# Patient Record
Sex: MALE | Race: WHITE | URBAN - METROPOLITAN AREA
[De-identification: names, ages, dates, MRNs, and addresses within clinical notes are randomized per-mention and may not be internally consistent; named-entity substitution may affect disease eponyms.]

---

## 2017-01-12 ENCOUNTER — TRANSFERRED RECORDS (OUTPATIENT)
Dept: HEALTH INFORMATION MANAGEMENT | Facility: CLINIC | Age: 67
End: 2017-01-12

## 2017-02-21 ENCOUNTER — HOSPITAL ENCOUNTER (OUTPATIENT)
Dept: PHYSICAL THERAPY | Facility: CLINIC | Age: 67
Setting detail: THERAPIES SERIES
End: 2017-02-21
Payer: COMMERCIAL

## 2017-02-21 PROCEDURE — 40000719 ZZHC STATISTIC PT DEPARTMENT NEURO VISIT: Performed by: PHYSICAL THERAPIST

## 2017-02-21 PROCEDURE — 97162 PT EVAL MOD COMPLEX 30 MIN: CPT | Mod: GP | Performed by: PHYSICAL THERAPIST

## 2017-02-21 NOTE — PROGRESS NOTES
[facility]        OUTPATIENT PHYSICAL THERAPY FUNCTIONAL EVALUATION  PLAN OF TREATMENT FOR OUTPATIENT REHABILITATION  (COMPLETE FOR INITIAL CLAIMS ONLY)  Patient's Last Name, First Name, M.I.  YOB: 1950  Don Redd        Provider's Name   [facility]   Medical Record No.  [id number]     Start of Care Date:  02/21/17   Onset Date:  01/12/17   Type:     _X__PT   ____OT  ____SLP Medical Diagnosis:  balance problem     PT Diagnosis:  imbalance, impaired gait pattern, deconditioned Visits from SOC:  1                              __________________________________________________________________________________  Plan of Treatment: balance training, gait training, neuromuscular re-education, ROM, strengthening, stretching, transfer training, conditioning ex     Functional Goals:  1  Pt will score 25/30 on the Functional Gait Assessment indicating a reduced falls risk.  04/21/17    2  Pt will be free from falls both in/out of his home.  04/21/17    3  Pt will be able to walk on uneven ground without LOB to be able to participate in hunting safely.  04/21/17    Therapy Frequency:  2 times/Week     Predicted Duration of Therapy Intervention:  60 days    Chelsey Carty, PT                                    I CERTIFY THE NEED FOR THESE SERVICES FURNISHED UNDER        THIS PLAN OF TREATMENT AND WHILE UNDER MY CARE .             Physician Signature               Date    X_____________________________________________________                      Certification Date From:  02/21/17   Certification Date To:  04/21/17    Referring Provider:  Dr. Gabriel Hayes - [facility]    Initial Assessment  See Epic Evaluation- Start of Care Date: 02/21/17

## 2017-02-21 NOTE — PROGRESS NOTES
" 02/21/17 1031   Quick Adds   Quick Adds Certification;Vestibular Eval   Type of Visit Initial OP PT Evaluation   General Information   Start of Care Date 02/21/17   Referring Physician Dr. Gabriel Hayes - [facility]   Orders Evaluate and Treat as Indicated   Order Date 01/12/17   Medical Diagnosis Balance problem   Onset of illness/injury or Date of Surgery 01/12/17   Precautions/Limitations no known precautions/limitations   Surgical/Medical history reviewed Yes   Pertinent history of current problem (include personal factors and/or comorbidities that impact the POC) Pt reports a gradual onset of balance problems, he can't walk a straight line sometimes.  Seems to veer R most of the time.  Thinks he wouldn't \"pass a sobriety test\" even though he hasn't been drinking.  Sx seem to be worsening over the last few months.  He trips easily and this is worse on uneven ground.  He had an overnight observation stay at [facility] on 12/24/16 for an episode of syncope and reports no further episodes of this. He denies issues with dizziness, lightheadedness or vision problems.  He does wear bifocals and will be getting hearing aids soon.  He has known CTS B and reports he is having another surgery on the R side in March for this as well as a \"moving the nerve\" surgery on the R side.  He has had a 2 level cervical fusion in the last few years as well as \"back surgery\".  He has had trouble with his R leg before surgery and even now, it gives out on him at times.  He feels both arms are weak, poor  and he tires easily.  He reports a PmHx of DM and a slight heart murmur. EPIC also shows HTN and high cholesterol.  He feels like he is always walking on something on both feet, more in the area of the met heads.  He has trouble with stairs - both going up and down.  He initially denies pain when asked but then says his back \"always hurts\".   He reports he checks his BS every once in awhile.   Current Community " "Support Family/friend caregiver   Patient role/Employment history Retired  ()   Living environment House/[facility]   Home/Community Accessibility Comments lives in mother-in-law apartment, has to go down 4 steps with one railing to get into house   Patient/Family Goals Statement wants to balance better   General Information Comments drives own vehicle   Fall Risk Screen   Fall screen completed by PT   Timed Up and Go score (seconds) 10.44\"   Is patient a fall risk? Yes;Department fall risk interventions implemented   Fall screen comments Just the one fall when he \"passed out\" reported.   Cognitive Status Examination   Orientation orientation to person, place and time   Level of Consciousness alert   Follows Commands and Answers Questions 100% of the time   Personal Safety and Judgment intact   Cognitive Comment appears slightly slow to process at times   Observation   Observation appears slightly older than age, very slight difficulty understanding speech at times - not sure if due to dentures or ?, slight flat affect   Integumentary   Integumentary Comments B hands appeared slightly swollen - pt reports this is normal   Posture   Posture Forward head position;Protracted shoulders;Kyphosis   Range of Motion (ROM)   ROM Comment reduced R SH flexion by 25%   Strength   Strength Comments general LE strength 4+/5 B except 4- to 4/5 R knee extension   Gait Special Tests   Gait Special Tests FUNCTIONAL GAIT ASSESSMENT   Gait Special Tests Functional Gait Assessment Score out of 30   Score out of 30 20/30   Comments see flow sheet   Balance   Balance Comments 5 Times Sit to Stand test: shoes on, used arms on chair, took 15.19 sec to complete   Balance Special Tests   Balance Special Tests Timed up and go;Modified CTSIB Conditions;Romberg;Sit to stand reps   Balance Special Tests Timed Up and Go   Seconds 10.44 Seconds   Comments no AD, wide [place] when turning   Balance Special Tests Modified CTSIB Conditions "   Condition 1, seconds 30 Seconds   Condition 2, seconds 30 Seconds   Condition 4, seconds 30 Seconds   Condition 5, seconds 10.87 Seconds   Modified CTSIB Comments shoes on   Balance Special Tests Romberg   Seconds 30 Seconds   Comments both with EO and EC - increased sway with both conditions   Sensory Examination   Sensory Perception Comments intact to light touch in B lower legs although pt reports hx of walking on something near plantar surface of met heads B   Muscle Tone   Muscle Tone Comments generally deconditioned   Oculomotor Exam   Smooth Pursuit Comment normal smooth pursuit   Saccades Comments did not appear to be focusing on fingers, slight overshoots B directions   Infrared Goggle Exam or Frenzel Lense Exam   Exam completed with Room Light   Spontaneous Nystagmus Negative   Planned Therapy Interventions   Planned Therapy Interventions balance training;gait training;neuromuscular re-education;ROM;strengthening;stretching;transfer training   Planned Therapy Interventions Comment conditioning ex   Clinical Impression   Criteria for Skilled Therapeutic Interventions Met yes, treatment indicated   PT Diagnosis imbalance, impaired gait pattern, deconditioned   Influenced by the following impairments imbalance, weakness   Functional limitations due to impairments walking, standing, stairs, outdoor activities like hunting   Clinical Presentation Evolving/Changing   Clinical Presentation Rationale History: DM, recent DC from hospital, awaiting UE surgery and inactive; Examination: falls risk category on FGA, score on 5 Times Sit to Stand, gait instability walking outside; Presentation: gradual worsening of sx; Decision-making: moderate complexity   Clinical Decision Making (Complexity) Moderate complexity   Therapy Frequency 2 times/Week   Predicted Duration of Therapy Intervention (days/wks) 60 days   Risk & Benefits of therapy have been explained Yes   Patient, Family & other staff in agreement with plan of  care Yes   Clinical Impression Comments Pt presents to PT with impaired balance which puts him at a falls risk.  Cause of imbalance is unknown at this time.  Pt would benefit from skilled PT services addressing balance deficits as well as deconditioning/weakness.  Presentation is complicated by hx of cervical and lumbar surgeries and ongoing carpal tunnel issues/future surgery.  Pt may benefit from ruling out any central component to balance impairments if improvement is not noted over time.   Education Assessment   Preferred Learning Style Listening;Reading;Demonstration;Pictures/video   Barriers to Learning No barriers   GOALS   PT Eval Goals 1;2;3   Goal 1   Goal Identifier 1   Goal Description Pt will score 25/30 on the Functional Gait Assessment indicating a reduced falls risk.   Target Date 04/21/17   Goal 2   Goal Identifier 2   Goal Description Pt will be free from falls both in/out of his home.   Target Date 04/21/17   Goal 3   Goal Identifier 3   Goal Description Pt will be able to walk on uneven ground without LOB to be able to participate in hunting safely.   Target Date 04/21/17   Total Evaluation Time   Total Evaluation Time (Minutes) 55   Therapy Certification   Certification date from 02/21/17   Certification date to 04/21/17   Medical Diagnosis balance problem   Certification I certify the need for these services furnished under this plan of treatment and while under my care.  (Physician co-signature of this document indicates review and certification of the therapy plan).       Thank you for referring Griffin to physical therapy.  If you have any questions, please call [phone].      Chelsey Carty, PT  [facility]

## 2017-02-27 ENCOUNTER — HOSPITAL ENCOUNTER (OUTPATIENT)
Dept: PHYSICAL THERAPY | Facility: CLINIC | Age: 67
Setting detail: THERAPIES SERIES
End: 2017-02-27
Payer: COMMERCIAL

## 2017-02-27 PROCEDURE — 97112 NEUROMUSCULAR REEDUCATION: CPT | Mod: GP | Performed by: PHYSICAL THERAPIST

## 2017-02-27 PROCEDURE — 40000719 ZZHC STATISTIC PT DEPARTMENT NEURO VISIT: Performed by: PHYSICAL THERAPIST

## 2017-03-03 ENCOUNTER — HOSPITAL ENCOUNTER (OUTPATIENT)
Dept: PHYSICAL THERAPY | Facility: CLINIC | Age: 67
Setting detail: THERAPIES SERIES
End: 2017-03-03
Payer: COMMERCIAL

## 2017-03-03 PROCEDURE — 97116 GAIT TRAINING THERAPY: CPT | Mod: GP | Performed by: PHYSICAL THERAPIST

## 2017-03-03 PROCEDURE — 40000719 ZZHC STATISTIC PT DEPARTMENT NEURO VISIT: Performed by: PHYSICAL THERAPIST

## 2017-03-03 PROCEDURE — 97112 NEUROMUSCULAR REEDUCATION: CPT | Mod: GP | Performed by: PHYSICAL THERAPIST

## 2017-03-10 ENCOUNTER — HOSPITAL ENCOUNTER (OUTPATIENT)
Dept: PHYSICAL THERAPY | Facility: CLINIC | Age: 67
Setting detail: THERAPIES SERIES
End: 2017-03-10
Payer: COMMERCIAL

## 2017-03-10 PROCEDURE — 97110 THERAPEUTIC EXERCISES: CPT | Mod: GP | Performed by: PHYSICAL THERAPIST

## 2017-03-10 PROCEDURE — 97112 NEUROMUSCULAR REEDUCATION: CPT | Mod: GP | Performed by: PHYSICAL THERAPIST

## 2017-03-10 PROCEDURE — 40000719 ZZHC STATISTIC PT DEPARTMENT NEURO VISIT: Performed by: PHYSICAL THERAPIST

## 2017-03-13 ENCOUNTER — HOSPITAL ENCOUNTER (OUTPATIENT)
Dept: PHYSICAL THERAPY | Facility: CLINIC | Age: 67
Setting detail: THERAPIES SERIES
End: 2017-03-13
Payer: COMMERCIAL

## 2017-03-13 PROCEDURE — 40000719 ZZHC STATISTIC PT DEPARTMENT NEURO VISIT: Performed by: PHYSICAL THERAPIST

## 2017-03-13 PROCEDURE — 97112 NEUROMUSCULAR REEDUCATION: CPT | Mod: GP | Performed by: PHYSICAL THERAPIST

## 2017-03-13 PROCEDURE — 97110 THERAPEUTIC EXERCISES: CPT | Mod: GP | Performed by: PHYSICAL THERAPIST

## 2017-03-15 ENCOUNTER — HOSPITAL ENCOUNTER (OUTPATIENT)
Dept: PHYSICAL THERAPY | Facility: CLINIC | Age: 67
Setting detail: THERAPIES SERIES
End: 2017-03-15
Payer: COMMERCIAL

## 2017-03-15 PROCEDURE — 97110 THERAPEUTIC EXERCISES: CPT | Mod: GP | Performed by: PHYSICAL THERAPIST

## 2017-03-15 PROCEDURE — 97112 NEUROMUSCULAR REEDUCATION: CPT | Mod: GP | Performed by: PHYSICAL THERAPIST

## 2017-03-15 PROCEDURE — 40000719 ZZHC STATISTIC PT DEPARTMENT NEURO VISIT: Performed by: PHYSICAL THERAPIST

## 2017-04-27 NOTE — PROGRESS NOTES
Outpatient Physical Therapy Discharge Note     Patient: Don Redd    : 1950    Beginning/End Dates of Reporting Period: 17 to 3/15/17    Referring Provider: Dr. Hayes    Therapy Diagnosis: imbalance, impaired gait pattern, deconditioned     Client Self Report: Nothing too new.    Objective Measurements:  Objective Measure: observation  Details: observed pt veering L with self-corrected LOB when walking in     Goals:  Goal Identifier 1   Goal Description Pt will score 25/30 on the Functional Gait Assessment indicating a reduced falls risk.   Target Date 17   Date Met      Progress:     Goal Identifier 2   Goal Description Pt will be free from falls both in/out of his home.   Target Date 17   Date Met      Progress:     Goal Identifier 3   Goal Description Pt will be able to walk on uneven ground without LOB to be able to participate in hunting safely.   Target Date 17   Date Met      Progress:     Progress Toward Goals:  Unknown if specific goals are met as pt did not return for last appointment when the plan was to recheck objective measures.  Pt was planning to have surgery on his hand/elbow and then wanted to follow up with his provider re: some LB concerns.  He never returned so he will be discharged from PT.    Plan: Discharge from therapy.    Reason for Discharge: Patient chooses to discontinue therapy. Patient has failed to schedule further appointments.    Equipment Issued: none    Discharge Plan: follow up with provider

## 2017-04-27 NOTE — ADDENDUM NOTE
Encounter addended by: Chelsey Carty, PT on: 4/27/2017  9:37 AM<BR>     Actions taken: Episode resolved, Pend clinical note, Sign clinical note

## 2017-05-23 ENCOUNTER — HOSPITAL ENCOUNTER (EMERGENCY)
Facility: CLINIC | Age: 67
Discharge: HOME | End: 2017-05-23
Attending: FAMILY MEDICINE | Admitting: FAMILY MEDICINE
Payer: COMMERCIAL

## 2017-05-23 VITALS
BODY MASS INDEX: 25.2 KG/M2 | OXYGEN SATURATION: 97 % | DIASTOLIC BLOOD PRESSURE: 91 MMHG | SYSTOLIC BLOOD PRESSURE: 147 MMHG | HEIGHT: 71 IN | TEMPERATURE: 97.1 F | WEIGHT: 180 LBS | RESPIRATION RATE: 16 BRPM
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
## 2017-05-23 LAB
BASOPHILS # BLD AUTO: 0 10E9/L (ref 0–0.2)
BASOPHILS NFR BLD AUTO: 0.5 %
CRP SERPL-MCNC: <2.9 MG/L (ref 0–8)
DIFFERENTIAL METHOD BLD: ABNORMAL
EOSINOPHIL # BLD AUTO: 0.3 10E9/L (ref 0–0.7)
EOSINOPHIL NFR BLD AUTO: 4.3 %
ERYTHROCYTE [DISTWIDTH] IN BLOOD BY AUTOMATED COUNT: 13.9 % (ref 10–15)
ERYTHROCYTE [SEDIMENTATION RATE] IN BLOOD BY WESTERGREN METHOD: 8 MM/H (ref 0–20)
HCT VFR BLD AUTO: 39 % (ref 40–53)
HGB BLD-MCNC: 13.6 G/DL (ref 13.3–17.7)
IMM GRANULOCYTES # BLD: 0 10E9/L (ref 0–0.4)
IMM GRANULOCYTES NFR BLD: 0.2 %
LYMPHOCYTES # BLD AUTO: 1.6 10E9/L (ref 0.8–5.3)
LYMPHOCYTES NFR BLD AUTO: 24.6 %
MCH RBC QN AUTO: 28 PG (ref 26.5–33)
MCHC RBC AUTO-ENTMCNC: 34.9 G/DL (ref 31.5–36.5)
MCV RBC AUTO: 80 FL (ref 78–100)
MONOCYTES # BLD AUTO: 0.5 10E9/L (ref 0–1.3)
MONOCYTES NFR BLD AUTO: 8 %
NEUTROPHILS # BLD AUTO: 4.1 10E9/L (ref 1.6–8.3)
NEUTROPHILS NFR BLD AUTO: 62.4 %
PLATELET # BLD AUTO: 255 10E9/L (ref 150–450)
RBC # BLD AUTO: 4.85 10E12/L (ref 4.4–5.9)
WBC # BLD AUTO: 6.5 10E9/L (ref 4–11)

## 2017-05-23 PROCEDURE — 99283 EMERGENCY DEPT VISIT LOW MDM: CPT | Performed by: FAMILY MEDICINE

## 2017-05-23 PROCEDURE — 86618 LYME DISEASE ANTIBODY: CPT | Performed by: FAMILY MEDICINE

## 2017-05-23 PROCEDURE — 86140 C-REACTIVE PROTEIN: CPT | Performed by: FAMILY MEDICINE

## 2017-05-23 PROCEDURE — 85652 RBC SED RATE AUTOMATED: CPT | Performed by: FAMILY MEDICINE

## 2017-05-23 PROCEDURE — 85025 COMPLETE CBC W/AUTO DIFF WBC: CPT | Performed by: FAMILY MEDICINE

## 2017-05-23 PROCEDURE — 99283 EMERGENCY DEPT VISIT LOW MDM: CPT | Mod: Z6 | Performed by: FAMILY MEDICINE

## 2017-05-23 RX ORDER — DOXYCYCLINE 100 MG/1
100 CAPSULE ORAL 2 TIMES DAILY
Qty: 28 CAPSULE | Refills: 0 | Status: SHIPPED | OUTPATIENT
Start: 2017-05-23 | End: 2017-06-06

## 2017-05-23 RX ORDER — LIDOCAINE 40 MG/G
CREAM TOPICAL
Status: DISCONTINUED | OUTPATIENT
Start: 2017-05-23 | End: 2017-05-23 | Stop reason: HOSPADM

## 2017-05-23 ASSESSMENT — ENCOUNTER SYMPTOMS: FEVER: 0

## 2017-05-23 NOTE — ED PROVIDER NOTES
History     Chief Complaint   Patient presents with     Cellulitis     The history is provided by the patient.     Don Redd is a 66 year old male who presents to the emergency department with tick bite.  Patient states that a tick was attached 2 or 3 days ago and was removed less than 24 hours after attachment. He wonders if the head is still attached.  Patient reports that the redness started a couple days ago and the area of infection itches. Patient also notes that when he squeezes the area of tick attachment, white pus comes out.  He denies fever or tenderness to the surrounding area.  Patient notes that his only chronic health problem is diabetes.    I have reviewed the Medications, Allergies, Past Medical and Surgical History, and Social History in the Epic system.    Patient Active Problem List   Diagnosis     Syncope, unspecified syncope type     Elevated lactic acid level     Diabetes mellitus, type 2 (H)     Essential hypertension, benign     Hyperlipidemia LDL goal <100     Past Medical History:   Diagnosis Date     Diabetes type 2, controlled (H)      Hypertension      Ureteral calculi 4/2016       Past Surgical History:   Procedure Laterality Date     AS LUMBAR SPINE FUSN,POST INTRBDY  2011     CYSTOSCOPY       FUSION CERVICAL ANTERIOR TWO LEVELS  2011    C5-7       Family History   Problem Relation Age of Onset     Coronary Artery Disease Mother      Breast Cancer Mother      Hypertension Mother      Other Cancer Father      lung cancer       Social History   Substance Use Topics     Smoking status: Former Smoker     Smokeless tobacco: Not on file     Alcohol use Yes        There is no immunization history on file for this patient.     No Known Allergies    Current Outpatient Prescriptions   Medication Sig Dispense Refill     ASPIRIN PO Take 81 mg by mouth daily       lisinopril-hydrochlorothiazide (PRINZIDE/ZESTORETIC) 20-12.5 MG per tablet Take 1 tablet by mouth every morning 30 tablet       "metFORMIN (GLUCOPHAGE) 500 MG tablet Take 1,000 mg by mouth 2 times daily (with meals) Pt takes it in the AM and the evening.       SIMVASTATIN PO Take 40 mg by mouth At Bedtime         Review of Systems   Constitutional: Negative for fever.   All other systems reviewed and are negative.      Physical Exam   BP: (!) 147/91  Heart Rate: 73  Temp: 97.1  F (36.2  C)  Resp: 16  Height: 180.3 cm (5' 11\")  Weight: 81.6 kg (180 lb)  SpO2: 97 %  Physical Exam   Constitutional: He is oriented to person, place, and time. He appears well-developed and well-nourished.   HENT:   Head: Normocephalic and atraumatic.   Eyes: Conjunctivae and EOM are normal.   Neck: Normal range of motion. Neck supple.   Cardiovascular: Normal rate, regular rhythm, normal heart sounds and intact distal pulses.    Pulses:       Dorsalis pedis pulses are 2+ on the left side.        Posterior tibial pulses are 2+ on the left side.   Pulmonary/Chest: Effort normal and breath sounds normal.   Abdominal: Soft. Bowel sounds are normal.   Musculoskeletal: Normal range of motion.   Neurological: He is alert and oriented to person, place, and time.   Skin: Skin is warm. There is erythema (lateral aspect of left leg from ankle to tibia).   Left lower extremity: erythema lateral aspect from ankle to tibia with associated swelling. No tenderness.   Nursing note and vitals reviewed.      ED Course     ED Course     Procedures        Results for orders placed or performed during the hospital encounter of 05/23/17   CBC with platelets differential   Result Value Ref Range    WBC 6.5 4.0 - 11.0 10e9/L    RBC Count 4.85 4.4 - 5.9 10e12/L    Hemoglobin 13.6 13.3 - 17.7 g/dL    Hematocrit 39.0 (L) 40.0 - 53.0 %    MCV 80 78 - 100 fl    MCH 28.0 26.5 - 33.0 pg    MCHC 34.9 31.5 - 36.5 g/dL    RDW 13.9 10.0 - 15.0 %    Platelet Count 255 150 - 450 10e9/L    Diff Method Automated Method     % Neutrophils 62.4 %    % Lymphocytes 24.6 %    % Monocytes 8.0 %    % Eosinophils " 4.3 %    % Basophils 0.5 %    % Immature Granulocytes 0.2 %    Absolute Neutrophil 4.1 1.6 - 8.3 10e9/L    Absolute Lymphocytes 1.6 0.8 - 5.3 10e9/L    Absolute Monocytes 0.5 0.0 - 1.3 10e9/L    Absolute Eosinophils 0.3 0.0 - 0.7 10e9/L    Absolute Basophils 0.0 0.0 - 0.2 10e9/L    Abs Immature Granulocytes 0.0 0 - 0.4 10e9/L   CRP inflammation   Result Value Ref Range    CRP Inflammation <2.9 0.0 - 8.0 mg/L   Erythrocyte sedimentation rate auto   Result Value Ref Range    Sed Rate 8 0 - 20 mm/h     Medications   lidocaine 1 % 1 mL (not administered)   lidocaine (LMX4) kit (not administered)   sodium chloride (PF) 0.9% PF flush 3 mL (not administered)   sodium chloride (PF) 0.9% PF flush 3 mL (not administered)     labs are reviewed and were unremarkable.  There is redness noted of the lower leg, I will go ahead and start the patient in the course of doxycycline.  Because this is a tick right, this will cover for lines but also give good coverage for typical cellulitis coverage.  The area was marked and I will have the patient follow-up with his doctor if there is no improvement over the next few days.  Assessments & Plan (with Medical Decision Making)  infected tick bite      I have reviewed the nursing notes.    I have reviewed the findings, diagnosis, plan and need for follow up with the patient.          This document serves as a record of services personally performed by Thee Weathers M.D. It was created on their behalf by Radha Ignacio and Homero Miranda, a trained medical scribe. The creation of this record is based on the provider's personal observations and the statements of the patient. This document has been checked and approved by the attending provider.     Note: Chart documentation done in part with Dragon Voice Recognition software. Although reviewed after completion, some word and grammatical errors may remain.    5/23/2017   [facility] EMERGENCY DEPARTMENT     Thee Weathers  MD Dami  05/23/17 5340

## 2017-05-23 NOTE — ED AVS SNAPSHOT
[facility] Emergency Department    [address] [place] [address]    Phone:  [phone]    Fax:  [phone]                                       Don Redd   MRN: [id number]    Department:  [facility] Emergency Department   Date of Visit:  5/23/2017           After Visit Summary Signature Page     I have received my discharge instructions, and my questions have been answered. I have discussed any challenges I see with this plan with the nurse or doctor.    ..........................................................................................................................................  Patient/Patient Representative Signature      ..........................................................................................................................................  Patient Representative Print Name and Relationship to Patient    ..................................................               ................................................  Date                                            Time    ..........................................................................................................................................  Reviewed by Signature/Title    ...................................................              ..............................................  Date                                                            Time

## 2017-05-23 NOTE — DISCHARGE INSTRUCTIONS
Insect Bite/Sting, Infected    Insect stings involve the injection of venom from the insect. Insect bites do not. Both may cause limited or whole-body reactions. Bites and stings may become infected. Signs of infection include redness, warmth, pain, drainage of pus, and swelling. Infections will need treatment with antibiotics and should improve over the next 10 days.  Home care  The following will help you care for your bite or sting at home:  1. If itching is a problem, applying ice packs to the sting area will help.  2. Wash the area with soap and water at least 3 times a day. Apply a topical antibiotic cream or ointment.  3. You can use an over-the counter antihistamine unless you were given a prescription antihistamine. Antihistamines may be used to reduce itching if large areas of the skin are involved. Use lower doses during the daytime and higher doses at bedtime since the drug may make you sleepy. Do not use an antihistamine if you have glaucoma or if you are a man with trouble urinating due to an enlarged prostate. Some antihistamines cause less drowsiness and are a good choice for daytime use.  4. If oral antibiotics have been prescribed, be sure to take them as directed until they are all finished.  5. You may use acetaminophen or ibuprofen to control pain, unless another pain medicine was prescribed. If you have chronic liver or kidney disease or ever had a stomach ulcer or gastrointestinal bleeding, talk with your health care provider before using these medicines.  Follow-up care  Follow up with your health care provider as directed if you do not improve over the next 2 days or if your symptoms worsen.  When to seek medical care  Get prompt medical attention if any of the following occur:    Spreading areas of redness or swelling    Swelling of the face, eyelids, mouth, throat, or tongue    Difficulty swallowing or breathing    Fever of 100.4 F (38 C) or higher, or as directed by your health care  provider    Increased local pain    Headache, fever, chills, muscle or joint aching, vomiting,    New rash    [id number] [employer]. [address] [place] [address], [place] [address]. All rights reserved. This information is not intended as a substitute for professional medical care. Always follow your healthcare professional's instructions.

## 2017-05-23 NOTE — ED AVS SNAPSHOT
[facility] Emergency Department    [address] [place] [address]    Phone:  [phone]    Fax:  [phone]                                       Don Redd   MRN: [id number]    Department:  [facility] Emergency Department   Date of Visit:  5/23/2017           Patient Information     Date Of Birth          1950        Your diagnoses for this visit were:     Infected tick bite, initial encounter        You were seen by Thee Weathers MD.      Follow-up Information     Follow up with [facility]. Schedule an appointment as soon as possible for a visit in 3 days.    Why:  If not improving.    Contact information:    [address]  [place] [address] [phone]          Discharge Instructions         Insect Bite/Sting, Infected    Insect stings involve the injection of venom from the insect. Insect bites do not. Both may cause limited or whole-body reactions. Bites and stings may become infected. Signs of infection include redness, warmth, pain, drainage of pus, and swelling. Infections will need treatment with antibiotics and should improve over the next 10 days.  Home care  The following will help you care for your bite or sting at home:  1. If itching is a problem, applying ice packs to the sting area will help.  2. Wash the area with soap and water at least 3 times a day. Apply a topical antibiotic cream or ointment.  3. You can use an over-the counter antihistamine unless you were given a prescription antihistamine. Antihistamines may be used to reduce itching if large areas of the skin are involved. Use lower doses during the daytime and higher doses at bedtime since the drug may make you sleepy. Do not use an antihistamine if you have glaucoma or if you are a man with trouble urinating due to an enlarged prostate. Some antihistamines cause less drowsiness and are a good choice for daytime use.  4. If oral antibiotics have been prescribed, be  sure to take them as directed until they are all finished.  5. You may use acetaminophen or ibuprofen to control pain, unless another pain medicine was prescribed. If you have chronic liver or kidney disease or ever had a stomach ulcer or gastrointestinal bleeding, talk with your health care provider before using these medicines.  Follow-up care  Follow up with your health care provider as directed if you do not improve over the next 2 days or if your symptoms worsen.  When to seek medical care  Get prompt medical attention if any of the following occur:    Spreading areas of redness or swelling    Swelling of the face, eyelids, mouth, throat, or tongue    Difficulty swallowing or breathing    Fever of 100.4 F (38 C) or higher, or as directed by your health care provider    Increased local pain    Headache, fever, chills, muscle or joint aching, vomiting,    New rash    3863-3476 [employer]. [address] [place] [address]. All rights reserved. This information is not intended as a substitute for professional medical care. Always follow your healthcare professional's instructions.          24 Hour Appointment Hotline       To make an appointment at any [facility], call [phone] ([phone]). If you don't have a family doctor or clinic, we will help you find one. [facility] clinics are conveniently located to serve the needs of you and your family.             Review of your medicines      START taking        Dose / Directions Last dose taken    doxycycline Monohydrate 100 MG Caps   Dose:  100 mg   Quantity:  28 capsule        Take 1 capsule (100 mg) by mouth 2 times daily for 14 days   Refills:  0          Our records show that you are taking the medicines listed below. If these are incorrect, please call your family doctor or clinic.        Dose / Directions Last dose taken    ASPIRIN PO   Dose:  81 mg        Take 81 mg by mouth daily   Refills:  0         lisinopril-hydrochlorothiazide 20-12.5 MG per tablet   Commonly known as:  PRINZIDE/ZESTORETIC   Dose:  1 tablet   Quantity:  30 tablet        Take 1 tablet by mouth every morning   Refills:  0        metFORMIN 500 MG tablet   Commonly known as:  GLUCOPHAGE   Dose:  1000 mg        Take 1,000 mg by mouth 2 times daily (with meals) Pt takes it in the AM and the evening.   Refills:  0        SIMVASTATIN PO   Dose:  40 mg        Take 40 mg by mouth At Bedtime   Refills:  0                Prescriptions were sent or printed at these locations (1 Prescription)                   [facility] [address] [place] [address] [address]   [address] [facility] [address]    Telephone:  [phone]   Fax:  [phone]   Hours:                  Printed at Department/Unit printer (1 of 1)         doxycycline Monohydrate 100 MG CAPS                Procedures and tests performed during your visit     CBC with platelets differential    CRP inflammation    Erythrocyte sedimentation rate auto    Lyme Disease Stephany with reflex to WB Serum    Peripheral IV catheter      Orders Needing Specimen Collection     None      Pending Results     Date and Time Order Name Status Description    5/23/2017 1335 Lyme Disease Stephany with reflex to WB Serum In process             Pending Culture Results     No orders found from 5/21/2017 to 5/24/2017.            Pending Results Instructions     If you had any lab results that were not finalized at the time of your Discharge, you can call the ED Lab Result RN at [phone]. You will be contacted by this team for any positive Lab results or changes in treatment. The nurses are available 7 days a week from 10A to 6:30P.  You can leave a message 24 hours per day and they will return your call.        Thank you for choosing [facility]       Thank you for choosing [facility] for your care. Our goal is always to provide you with excellent care. Hearing back from our patients is one way we can continue to improve  "our services. Please take a few minutes to complete the written survey that you may receive in the mail after you visit with us. Thank you!        [employer] Interactive Information     [employer] lets you send messages to your doctor, view your test results, renew your prescriptions, schedule appointments and more. To sign up, go to [email or web address] . Click on \"Log in\" on the left side of the screen, which will take you to the Welcome page. Then click on \"Sign up Now\" on the right side of the page.     You will be asked to enter the access code listed below, as well as some personal information. Please follow the directions to create your username and password.     Your access code is: [email or web address]  Expires: 2017  2:18 PM     Your access code will  in 90 days. If you need help or a new code, please call your [facility] clinic or [phone].        Care EveryWhere ID     This is your Care EveryWhere ID. This could be used by other organizations to access your [facility] medical records  [id number]        After Visit Summary       This is your record. Keep this with you and show to your community pharmacist(s) and doctor(s) at your next visit.                  "

## 2017-05-23 NOTE — ED NOTES
Removed tick three days ago and now has redness to left lower leg and believes the head may still be there.  Concern for Lymes.

## 2017-05-24 LAB — B BURGDOR IGG+IGM SER QL: NORMAL (ref 0–0.89)

## 2017-06-15 NOTE — ADDENDUM NOTE
Encounter addended by: Suzanne Donaldson PT on: 6/15/2017 11:37 AM<BR>     Actions taken: Flowsheet data copied forward, Flowsheet accepted

## 2017-06-20 NOTE — ADDENDUM NOTE
Encounter addended by: Suzanne Donaldson PT on: 6/20/2017 11:14 AM<BR>     Actions taken: Flowsheet data copied forward, Flowsheet accepted

## 2017-07-26 NOTE — ADDENDUM NOTE
Encounter addended by: Suzanne Donaldson PT on: 7/26/2017 12:48 PM<BR>     Actions taken: Flowsheet data copied forward, Flowsheet accepted

## 2017-08-07 PROBLEM — A77.40 EHRLICHIOSIS: Status: ACTIVE | Noted: 2017-06-20
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
## 2017-08-07 PROBLEM — E11.40 DIABETIC NEUROPATHY ASSOCIATED WITH TYPE 2 DIABETES MELLITUS (H): Status: ACTIVE | Noted: 2017-06-07

## 2017-08-07 PROBLEM — G99.2 STENOSIS OF CERVICAL SPINE WITH MYELOPATHY (H): Status: ACTIVE | Noted: 2017-08-02

## 2017-08-07 PROBLEM — M48.02 STENOSIS OF CERVICAL SPINE WITH MYELOPATHY (H): Status: ACTIVE | Noted: 2017-08-02

## 2017-08-07 PROBLEM — R26.89 BALANCE PROBLEM: Status: ACTIVE | Noted: 2017-01-14

## 2017-08-07 PROBLEM — M48.02 SPINAL STENOSIS OF CERVICAL REGION: Status: ACTIVE | Noted: 2017-08-02
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
## 2017-08-07 PROBLEM — M51.369 LUMBAR DEGENERATIVE DISC DISEASE: Status: ACTIVE | Noted: 2017-08-02

## 2017-08-07 PROBLEM — I10 HYPERTENSION: Status: ACTIVE | Noted: 2017-08-07

## 2017-08-08 ENCOUNTER — NURSING HOME VISIT (OUTPATIENT)
Dept: GERIATRICS | Facility: CLINIC | Age: 67
End: 2017-08-08
Payer: COMMERCIAL

## 2017-08-08 VITALS
RESPIRATION RATE: 16 BRPM | SYSTOLIC BLOOD PRESSURE: 128 MMHG | OXYGEN SATURATION: 98 % | WEIGHT: 187.4 LBS | BODY MASS INDEX: 26.14 KG/M2 | HEART RATE: 70 BPM | TEMPERATURE: 97.3 F | DIASTOLIC BLOOD PRESSURE: 88 MMHG

## 2017-08-08 DIAGNOSIS — E11.42 TYPE 2 DIABETES MELLITUS WITH DIABETIC POLYNEUROPATHY, WITHOUT LONG-TERM CURRENT USE OF INSULIN (H): ICD-10-CM

## 2017-08-08 DIAGNOSIS — E78.5 HYPERLIPIDEMIA LDL GOAL <100: ICD-10-CM

## 2017-08-08 DIAGNOSIS — G99.2 STENOSIS OF CERVICAL SPINE WITH MYELOPATHY (H): Primary | ICD-10-CM

## 2017-08-08 DIAGNOSIS — M51.369 LUMBAR DEGENERATIVE DISC DISEASE: ICD-10-CM

## 2017-08-08 DIAGNOSIS — M62.81 MUSCLE WEAKNESS (GENERALIZED): ICD-10-CM

## 2017-08-08 DIAGNOSIS — K59.00 CONSTIPATION, UNSPECIFIED CONSTIPATION TYPE: ICD-10-CM

## 2017-08-08 DIAGNOSIS — R26.89 BALANCE PROBLEM: ICD-10-CM

## 2017-08-08 DIAGNOSIS — M48.02 STENOSIS OF CERVICAL SPINE WITH MYELOPATHY (H): Primary | ICD-10-CM

## 2017-08-08 DIAGNOSIS — Z87.891 PERSONAL HISTORY OF TOBACCO USE, PRESENTING HAZARDS TO HEALTH: ICD-10-CM
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
## 2017-08-08 DIAGNOSIS — I10 ESSENTIAL HYPERTENSION, BENIGN: ICD-10-CM

## 2017-08-08 PROCEDURE — 99310 SBSQ NF CARE HIGH MDM 45: CPT | Performed by: NURSE PRACTITIONER

## 2017-08-08 NOTE — PROGRESS NOTES
[facility]  PRIMARY CARE PROVIDER AND CLINIC:  Ting Dyer [place] [address] / [place] [address]  Chief Complaint   Patient presents with     Hospital F/U       HPI:    Don Redd is a 66 year old  (1950),admitted to the [facility]  from [facility] .  Hospital stay through 8/4/17.  Admitted to this facility for  rehab, medical management and nursing care.  HPI information obtained from: facility chart records, facility staff, patient report and [facility] chart review.  Current issues are:         Stenosis of cervical spine with myelopathy  Balance problem  Lumbar degenerative disc disease  Fall, subsequent encounter  Muscle weakness (generalized)  Type 2 diabetes mellitus with diabetic polyneuropathy, without long-term current use of insulin (H)  Essential hypertension, benign  Hyperlipidemia LDL goal <100  Personal history of tobacco use, presenting hazards to health  Constipation, unspecified constipation type     Patient is a pleasant 66 year old gentleman who has admitted to [facility] from [facility] after he presented on 8/2/17 after a fall at home where his legs gave out on him and he fell.  He denied dizziness or LOC; endorsed increased LE weakness and numbness.  He has a PMH of lumbar DDD with chronic bilat pain and bilat sciatica, cervical stenosis with mild impingement of spinal cord and myelopathy.  He has had a thoracic OR and a cervical OR prior already.  He is seeing his PRIMARY CARE PROVIDER on Thursday for a pre-op and his neurosurgeon on 8/14/17 for f/u with MRI cervical spine prior to the office visit to determine need/when surgery will be.     He is met today outside of therapy where he reports constant pain at level 2-3 (tolerable) in his neck and back.  He denies any other SOB, CP, HA, lightheadedness, heartburn, upset stomach, diarrhea.  He notes some chronic  "constipation.  He reports he often goes home for the day and is here for rehab \"at the request of my wife.\" He notes that he takes his own pills (for constipation) when he is at home.     Other PMH includes DM2, HTN, HLD, tobacco use (quit 20 years ago per patient).     CODE STATUS/ADVANCE DIRECTIVES DISCUSSION:   CPR/Full code   Patient's living condition: basement of home with ex-spouse    ALLERGIES:Review of patient's allergies indicates no known allergies.  PAST MEDICAL HISTORY:  has a past medical history of Diabetes type 2, controlled (H); Hypertension; and Ureteral calculi (4/2016).  PAST SURGICAL HISTORY:  has a past surgical history that includes LUMBAR SPINE FUSN,POST INTRBDY (2011); Fusion cervical anterior two levels (2011); and cystoscopy.  FAMILY HISTORY: family history includes Breast Cancer in his mother; Coronary Artery Disease in his mother; Hypertension in his mother; Other Cancer in his father.  SOCIAL HISTORY:  reports that he has quit smoking. He does not have any smokeless tobacco history on file. He reports that he drinks alcohol. He reports that he does not use illicit drugs.    Post Discharge Medication Reconciliation Status: discharge medications reconciled, continue medications without change.  Current Outpatient Prescriptions   Medication Sig Dispense Refill     DEXAMETHASONE PO Take 2 mg by mouth 3 times daily (with meals)       GABAPENTIN PO Take 100 mg by mouth 2 times daily       ketoconazole (NIZORAL) 2 % shampoo Apply topically daily as needed for itching or irritation (also 2x weeklly)       HYDROcodone-acetaminophen (NORCO) 5-325 MG per tablet Take 1 tablet by mouth every 6 hours as needed for moderate to severe pain       ASPIRIN PO Take 81 mg by mouth daily       lisinopril-hydrochlorothiazide (PRINZIDE/ZESTORETIC) 20-12.5 MG per tablet Take 2 tablets by mouth daily  30 tablet      metFORMIN (GLUCOPHAGE) 500 MG tablet Take 1,000 mg by mouth 2 times daily (with meals) Pt takes " it in the AM and the evening.       SIMVASTATIN PO Take 40 mg by mouth At Bedtime         ROS:  10 point ROS of systems including Constitutional, Eyes, Respiratory, Cardiovascular, Gastroenterology, Genitourinary, Integumentary, Muscularskeletal, Psychiatric were all negative except for pertinent positives noted in my HPI.    Exam:  /88  Pulse 70  Temp 97.3  F (36.3  C)  Resp 16  Wt 187 lb 6.4 oz (85 kg)  SpO2 98%  BMI 26.14 kg/m2  GENERAL APPEARANCE:  Alert, in no distress  RESP:  respiratory effort and palpation of chest normal, auscultation of lungs clear , no respiratory distress  CV:  Palpation and auscultation of heart done , rate and rhythm regular, no murmur, no LE peripheral edema  ABDOMEN:  normal bowel sounds, soft, nontender, no hepatosplenomegaly or other masses  M/S:   Gait and station with unsteady gait, ambulating with 2WW, Digits and nails intact, slightly reduced muscle mass  SKIN:  Inspection and Palpation of skin and subcutaneous tissue pale, intact, no rashes appreciated  PSYCH:  insight and judgement, memory intact, affect and mood normal, follows commands readily         Lab/Diagnostic data:  Comparison:  MRI cervical spine 08/27/2013.    Findings:  Postsurgical changes secondary to anterior cervical discectomy and fusion from C5 through C7 are again demonstrated. Bony fusion at the postsurgical levels. Straightening of the cervical lordosis. No abnormalities of alignment identified. Vertebral body heights are preserved without evidence for acute fracture. Disc degeneration at all levels. Enhancement along the ventral surface of the thickened ligamentum flavum at C4-5 may be secondary to venous engorgement.   C2-3: Small diffuse disc bulge eccentric to the left. Left greater than right facet arthropathy. No significant spinal canal or neural foraminal stenosis.   C3-4: Diffuse disc bulge in conjunction with thickening of the ligamentum flavum results in essentially complete  effacement of the ventral and dorsal subarachnoid space and mild flattening of the cord. T2 hyperintensity within the right and left lateral cord at this level. Uncovertebral and facet arthropathy result in moderate to severe bilateral neural foraminal stenosis.   C4-5: Central disc protrusion in conjunction with marked thickening of the ligamentum flavum results in severe spinal canal stenosis and mild to moderate flattening of the cord. AP diameter of the canal approximately measures 5 millimeters. T2 signal abnormality within the cord at this level. Markedly severe left facet joint arthropathy and a large left facet joint effusion. Bilateral uncovertebral joint arthropathy. Severe left and moderate to severe right neural foraminal stenosis.   C5-6: Anterior fusion hardware. Posterior osteophyte indents and mild to moderately deforms the left cord without significant spinal canal stenosis. No cord signal abnormality. Bilateral uncovertebral facet arthropathy. Moderate to severe right and moderate left neural foraminal stenosis.   C6-7: Anterior fusion hardware. No significant spinal canal stenosis. Bilateral uncovertebral and facet arthropathy results in moderate bilateral neural foraminal stenosis.   C7-T1: Anterior fusion hardware. Small posterior disc osteophyte complex. Bilateral uncovertebral and facet joint arthropathy. Mild right and mild-to-moderate left neural foraminal stenosis.   No significant spinal canal or neural foraminal stenosis. The visualized upper thoracic spine.   Markedly enlarged, heterogeneous thyroid gland, incompletely visualized.     Impression:  1. No acute fracture or malalignment of the cervical spine.   2. Advanced multilevel spondylosis of the cervical spine has progressed compared to the MRI dated 08/27/2013.   3. At C4-5, severe spinal canal stenosis and mild to moderate flattening of the cord. Focal cord signal abnormality may represent myelomalacia, though in the setting of  trauma, a component of cord edema could also have this appearance.   4. At C3-4, severe spinal canal stenosis and mild flattening of the cord. Focal signal abnormality within the lateral cord at this level is favored to represent mild myelomalacia   5. At C5-6, posterior osteophyte mild to moderately deforms the left aspect of the cord without significant spinal canal stenosis. No cord signal abnormality.   6. Marked left facet joint arthropathy at C4-5 results in severe left neural foraminal stenosis. Left-sided facet joint effusion can be seen in setting of facet synovitis. There is also moderate to severe right neural foraminal stenosis this level.   7. Moderate to severe bilateral neural foraminal stenosis at C3-4.   8. Moderate to severe right and moderate left neural foraminal stenosis at C5-6.   9. Postsurgical changes secondary anterior cervical discectomy and fusion from C5 through C7.   10. Markedly enlarged, heterogeneous thyroid gland. Thyroid ultrasound is recommended for further evaluation if not previously performed.    Dictated by Jesus Milton MD @ Aug 2 2017 1:31PM  Signed by Dr. Jesus Milton @ Aug 2 2017 1:59PM        GLUCOSE METER (08/04/2017 6:44 AM)  Only the most recent of 4 results within the time period is included.    GLUCOSE METER (08/04/2017 6:44 AM)   Component Value Ref Range   GLUCOSE METER 172 (H) 65 - 100 mg/dL     Hemoglobin a1c AM (order if not done w/in 3 mos) (08/03/2017 8:15 AM)  Hemoglobin a1c AM (order if not done w/in 3 mos) (08/03/2017 8:15 AM)   Component Value Ref Range   HEMOGLOBIN A1C MONITORING (POCT) 5.9 <=6.4 %     CBC WITH AUTO DIFFERENTIAL (08/02/2017 4:54 PM)  CBC WITH AUTO DIFFERENTIAL (08/02/2017 4:54 PM)   Component Value Ref Range   WHITE BLOOD COUNT  10.7 4.5 - 11.0 thou/cu mm   RED BLOOD COUNT  5.14 4.30 - 5.90 mil/cu mm   HEMOGLOBIN  14.4 13.5 - 17.5 g/dL   HEMATOCRIT  41.5 37.0 - 53.0 %   MCV  81 80 - 100 fL   MCH  28.0 26.0 - 34.0 pg   MCHC  34.7 32.0 -  36.0 g/dL   RDW  13.4 11.5 - 15.5 %   PLATELET COUNT  193 140 - 440 thou/cu mm   MPV  9.4 6.5 - 11.0 fL   NEUTROPHILS  87.3 (H) 42.0 - 72.0 %   LYMPHOCYTES  9.3 (L) 20.0 - 44.0 %   MONOCYTES  2.0 <12.0 %   EOSINOPHILS  1.0 <8.0 %   BASOPHILS  0.2 <3.0 %   IMMATURE GRANULOCYTES(METAS,MYELOS,PROS) 0.2 %   ABSOLUTE NEUTROPHILS  9.3 (H) 1.7 - 7.0 thou/cu mm   ABSOLUTE LYMPHOCYTES  1.0 0.9 - 2.9 thou/cu mm   ABSOLUTE MONOCYTES  0.2 <0.9 thou/cu mm   ABSOLUTE EOSINOPHILS  0.1 <0.5 thou/cu mm   ABSOLUTE BASOPHILS  0.0 <0.3 thou/cu mm   ABSOLUTE IMMATURE GRANULOCYTES(METAS,MYELOS,PROS) 0.0 <0.3 thou/cu mm     INR ASAP (08/02/2017 4:54 PM)  INR ASAP (08/02/2017 4:54 PM)   Component Value Ref Range   INR 1.1 <1.3   PROTIME 13.9 11.7 - 14.1 sec     BASIC METABOLIC PANEL (08/02/2017 4:54 PM)  BASIC METABOLIC PANEL (08/02/2017 4:54 PM)   Component Value Ref Range   SODIUM 137 135 - 145 mmol/L   POTASSIUM 3.9 3.5 - 5.0 mmol/L   CHLORIDE 99 98 - 110 mmol/L   CO2,TOTAL 26 21 - 31 mmol/L   ANION GAP 12 5 - 18    GLUCOSE 216 (H) 65 - 100 mg/dL   CALCIUM 10.0 8.5 - 10.5 mg/dL   BUN 22 8 - 25 mg/dL   CREATININE 0.95 0.72 - 1.25 mg/dL   BUN/CREAT RATIO  23 (H) 10 - 20    GFR if African American >60 >60 ml/min/1.73m2   GFR if not African American >60 >60 ml/min/1.73m2         ASSESSMENT/PLAN:  Stenosis of cervical spine with myelopathy  Per DC summary:  MR of the cervical spine was done and showed no acute fracture, but did show advanced spondylosis and stenosis from C3-5 with impingement and even possible swelling of the cord. Neurosurgery was consulted, they have evaluated the patient who was then admitted for further management. Started on dexamethasone, taper as per neurosurgery.    Patient noting improved grasp with hands now that on dexametheasone taper.     PLAN:  F/U with Neurosurgery in one week (8/14/17) with MRI prior to visit - patient noting plan for surgery, plans to see PRIMARY CARE PROVIDER in [place] on Thursday for  pre-op. (noted to patient that could be done here, patient declined).   Will await communication regarding plan.    Balance problem  S/p weakness and fall (many falls in history)  Unsteady gait  Has neuropathy on ball of feet and toes   Is working with Physical Therapy, Occupational Therapy for strengthening     Lumbar degenerative disc disease  Chronic, with bilat LBP with bilat sciatica.  On Norco 5-325 mg q6 hours PRN  Has neurontin at 100 mg BID - patient and chart noting previous dosing of 600 mg BID - patient requesting this back.   Can taper up slowly, increasing to 300 mg BID x 3 days and then can return to 600 mg BID but will need close monitoring by nursing for increased falls risk.  Patient noted to lethargy with increased dosing and has been on this dose for months.   Good to note changes while here in TCU and can reduce if increased lethargy or increased potential for falls.     Fall, subsequent encounter  History of frequent falls.  Norco frequency reduced, gabapentin reduced.  Am raising gabapentin but will need close monitoring by nursing to monitor for S/Es (lethargy, dizziness,increased gait instability).      Muscle weakness (generalized)  2/2 lumbar DDD, spinal stenosis.   Physical Therapy, Occupational Therapy for rehab.    Type 2 diabetes mellitus with diabetic polyneuropathy, without long-term current use of insulin (H)  On Metformin 1000 mg BID - changed to qAM and HS per patient request.   8/3/17 A1C per Care Everywhere - 5.9    Patient reports numbness and tingling to balls of feet and toes; am rasing gabapentin to PTA dosing (stable per patient) but will need close monitoring.   accuchecks BID per patient request.     Essential hypertension, benign  On lisinopril-HCTZ 40 mg-25 mg daily.   BPs 130-140s/80s  HRs 59-81 (mostly in high 60s)    Patient denies CP, HA, lightheadedness   Will monitor.   May anticipate orthostatics, especially after gabapentin increase.     Hyperlipidemia LDL goal  <100  On simvastatin 40 mg qHS  Lipid panel - 1/12/17:  Chol 164  Trigs 149  HDL Chol 48  LDL Chol 86    Personal history of tobacco use, presenting hazards to health  Quit 1996, History of 50 pack years.     Constipation, unspecified constipation type  Patient noted he takes laxative at home as stool softeners don't work for him.   Nursing noting she gave Senna this AM.  Will monitor.        Orders:  1. Increase gabapentin to 300 mg po BID x 3 days, then can increase to PTA dosing of 600 mg po BID. Dx: Neuropathy.    Electronically signed by:  MIKE Campa CNP

## 2017-08-25 ENCOUNTER — NURSING HOME VISIT (OUTPATIENT)
Dept: GERIATRICS | Facility: CLINIC | Age: 67
End: 2017-08-25
Payer: COMMERCIAL

## 2017-08-25 VITALS
WEIGHT: 189.4 LBS | BODY MASS INDEX: 26.42 KG/M2 | SYSTOLIC BLOOD PRESSURE: 154 MMHG | OXYGEN SATURATION: 94 % | DIASTOLIC BLOOD PRESSURE: 94 MMHG | TEMPERATURE: 97.8 F | RESPIRATION RATE: 10 BRPM | HEART RATE: 79 BPM

## 2017-08-25 DIAGNOSIS — I10 ESSENTIAL HYPERTENSION: ICD-10-CM

## 2017-08-25 DIAGNOSIS — Z98.890 S/P CERVICAL DISCECTOMY: ICD-10-CM

## 2017-08-25 DIAGNOSIS — E78.5 HYPERLIPIDEMIA LDL GOAL <100: ICD-10-CM

## 2017-08-25 DIAGNOSIS — E11.42 TYPE 2 DIABETES MELLITUS WITH DIABETIC POLYNEUROPATHY, WITHOUT LONG-TERM CURRENT USE OF INSULIN (H): ICD-10-CM

## 2017-08-25 DIAGNOSIS — M48.02 SPINAL STENOSIS OF CERVICAL REGION: Primary | ICD-10-CM

## 2017-08-25 DIAGNOSIS — K59.03 DRUG-INDUCED CONSTIPATION: ICD-10-CM

## 2017-08-25 PROCEDURE — 99310 SBSQ NF CARE HIGH MDM 45: CPT | Performed by: NURSE PRACTITIONER

## 2017-08-25 RX ORDER — POLYETHYLENE GLYCOL 3350 17 G/17G
17 POWDER, FOR SOLUTION ORAL DAILY
COMMUNITY
End: 2017-09-05

## 2017-08-25 RX ORDER — CHOLECALCIFEROL (VITAMIN D3) 25 MCG
1000 CAPSULE ORAL DAILY
COMMUNITY

## 2017-08-25 RX ORDER — AMOXICILLIN 250 MG
2 CAPSULE ORAL 2 TIMES DAILY
Status: ON HOLD | COMMUNITY
End: 2017-09-01

## 2017-08-25 NOTE — MR AVS SNAPSHOT
"              After Visit Summary   8/25/2017    Don Redd    MRN: [id number]           Patient Information     Date Of Birth          1950        Visit Information        Provider Department      8/25/2017 12:30 PM Hyacinth Perez APRN CNP Geriatrics Transitional Care        Today's Diagnoses     Spinal stenosis of cervical region    -  1    S/P cervical discectomy        Essential hypertension        Type 2 diabetes mellitus with diabetic polyneuropathy, without long-term current use of insulin (H)        Drug-induced constipation        Hyperlipidemia LDL goal <100           Follow-ups after your visit        Who to contact     If you have questions or need follow up information about today's clinic visit or your schedule please contact GERIATRICS TRANSITIONAL CARE directly at [phone].  Normal or non-critical lab and imaging results will be communicated to you by Xangahart, letter or phone within 4 business days after the clinic has received the results. If you do not hear from us within 7 days, please contact the clinic through Xangahart or phone. If you have a critical or abnormal lab result, we will notify you by phone as soon as possible.  Submit refill requests through [employer]itRPG or call your pharmacy and they will forward the refill request to us. Please allow 3 business days for your refill to be completed.          Additional Information About Your Visit        [employer] Information     Habit[employer] lets you send messages to your doctor, view your test results, renew your prescriptions, schedule appointments and more. To sign up, go to [email or web address] . Click on \"Log in\" on the left side of the screen, which will take you to the Welcome page. Then click on \"Sign up Now\" on the right side of the page.     You will be asked to enter the access code listed below, as well as some personal information. Please follow the directions to create your username and password.     Your access code " is: [email or web address]  Expires: 2017  3:00 PM     Your access code will  in 90 days. If you need help or a new code, please call your [facility] or [phone].        Care EveryWhere ID     This is your Care EveryWhere ID. This could be used by other organizations to access your [facility] medical records  [id number]        Your Vitals Were     Pulse Temperature Respirations Pulse Oximetry BMI (Body Mass Index)       79 97.8  F (36.6  C) 10 94% 26.42 kg/m2        Blood Pressure from Last 3 Encounters:   17 177/83   17 141/76   17 126/86    Weight from Last 3 Encounters:   17 187 lb 6.3 oz (85 kg)   17 181 lb (82.1 kg)   17 181 lb (82.1 kg)              Today, you had the following     No orders found for display       Primary Care Provider Office Phone # Fax #    [facility] [phone]       [facility] [address]  [facility] [address]        Equal Access to Services     JOSHUA GUTHRIE : Hadii aad ku hadasho Soomaali, waaxda luqadaha, qaybta kaalmada adeegyada, dinorah kenneyn ruddy haley . So [facility] [phone].    ATENCIÓN: Si habla español, tiene a blanco disposición servicios gratuitos de asistencia lingüística. Llame al [phone].    We comply with applicable federal civil rights laws and [place] laws. We do not discriminate on the basis of race, color, national origin, age, disability sex, sexual orientation or gender identity.            Thank you!     Thank you for choosing GERIATRICS TRANSITIONAL CARE  for your care. Our goal is always to provide you with excellent care. Hearing back from our patients is one way we can continue to improve our services. Please take a few minutes to complete the written survey that you may receive in the mail after your visit with us. Thank you!             Your Updated Medication List - Protect others around you: Learn how to safely use, store and throw away your medicines at  www.disposemymeds.org.          This list is accurate as of: 8/25/17 11:59 PM.  Always use your most recent med list.                   Brand Name Dispense Instructions for use Diagnosis    ASPIRIN PO      Take 81 mg by mouth daily        DEXAMETHASONE PO      Take 2 mg by mouth See Admin Instructions        DIAZEPAM PO      Take 5 mg by mouth every 6 hours as needed for anxiety        FAMOTIDINE PO      Take 20 mg by mouth 2 times daily        GABAPENTIN PO      Take 600 mg by mouth 2 times daily        HYDROXYZINE PAMOATE PO      Take 25 mg by mouth At Bedtime        lisinopril-hydrochlorothiazide 20-12.5 MG per tablet    PRINZIDE/ZESTORETIC    30 tablet    Take 2 tablets by mouth daily        metFORMIN 500 MG tablet    GLUCOPHAGE     Take 1,000 mg by mouth 2 times daily (with meals) Pt takes it in the AM and the evening.        METHOCARBAMOL PO      Take 750 mg by mouth 4 times daily        NIZORAL 2 % shampoo   Generic drug:  ketoconazole      Apply topically daily as needed for itching or irritation (also 2x weeklly)        OXYCODONE HCL PO      Take 10 mg by mouth every 4 hours as needed        polyethylene glycol powder    MIRALAX/GLYCOLAX     Take 17 g by mouth daily        senna-docusate 8.6-50 MG per tablet    SENOKOT-S;PERICOLACE     Take 2 tablets by mouth 2 times daily        SIMVASTATIN PO      Take 40 mg by mouth At Bedtime        Vitamin D-3 1000 UNITS Caps      Take 1,000 Units by mouth daily

## 2017-08-25 NOTE — PROGRESS NOTES
[facility]  PRIMARY CARE PROVIDER AND CLINIC:  Ting D[facility] [address] / [place] [address]  Chief Complaint   Patient presents with     Hospital F/U       HPI:    Don Redd is a 66 year old  (1950),admitted to the [facility]  from Hospital  [facility] .  Hospital stay through 8/24/17.  Admitted to this facility for  rehab, medical management and nursing care.  HPI information obtained from: facility chart records, facility staff, patient report and Care Everywhere Epic chart review.  Current issues are:        1. Spinal stenosis of cervical region    2. S/P cervical discectomy    3. Essential hypertension    4. Type 2 diabetes mellitus with diabetic polyneuropathy, without long-term current use of insulin (H)    5. Drug-induced constipation    6. Hyperlipidemia LDL goal <100      Came to see Don today as he is back from [facility] after having the following procedure.  His Myelopathy was severe enough from his ED visit prior to surgery, Don was here at [facility].    Severe central cord syndrome secondary to cervical myelopathy encompassing the C4-C5, and the C3-C4 level.   2. Status post anterior cervical diskectomy, decompression, and fusion extending from C5 to C6 to C7.     Surgery was not complicated but will need extensive PT/OT while here.  Staff state that he is in pain and asking to address his pain medication.  Right now he does not want to participate in anything for the way he feels.  He knows if he does not do therapies, then his payment option will change.    CODE STATUS/ADVANCE DIRECTIVES DISCUSSION:   CPR/Full code   Patient's living condition: lives alone    ALLERGIES:Review of patient's allergies indicates no known allergies.  PAST MEDICAL HISTORY:  has a past medical history of Diabetes type 2, controlled (H); Hypertension; and Ureteral calculi (4/2016).  PAST SURGICAL HISTORY:  has  a past surgical history that includes LUMBAR SPINE FUSN,POST INTRBDY (2011); Fusion cervical anterior two levels (2011); and cystoscopy.  FAMILY HISTORY: family history includes Breast Cancer in his mother; Coronary Artery Disease in his mother; Hypertension in his mother; Other Cancer in his father.  SOCIAL HISTORY:  reports that he has quit smoking. He does not have any smokeless tobacco history on file. He reports that he drinks alcohol. He reports that he does not use illicit drugs.    Post Discharge Medication Reconciliation Status: discharge medications reconciled and changed, per note/orders (see AVS).  Current Outpatient Prescriptions   Medication Sig Dispense Refill     Cholecalciferol (VITAMIN D-3) 1000 UNITS CAPS Take 1,000 Units by mouth daily       DIAZEPAM PO Take 5 mg by mouth every 6 hours as needed for anxiety       FAMOTIDINE PO Take 20 mg by mouth 2 times daily       HYDROXYZINE PAMOATE PO Take 25 mg by mouth At Bedtime       METHOCARBAMOL PO Take 750 mg by mouth 4 times daily       polyethylene glycol (MIRALAX/GLYCOLAX) powder Take 17 g by mouth daily       OXYCODONE HCL PO Take 5-15 mg by mouth every 4 hours as needed       senna-docusate (SENOKOT-S;PERICOLACE) 8.6-50 MG per tablet Take 1-2 tablets by mouth 2 times daily       DEXAMETHASONE PO Take 2 mg by mouth See Admin Instructions        GABAPENTIN PO Take 600 mg by mouth 2 times daily        ketoconazole (NIZORAL) 2 % shampoo Apply topically daily as needed for itching or irritation (also 2x weeklly)       ASPIRIN PO Take 81 mg by mouth daily       lisinopril-hydrochlorothiazide (PRINZIDE/ZESTORETIC) 20-12.5 MG per tablet Take 2 tablets by mouth daily  30 tablet      metFORMIN (GLUCOPHAGE) 500 MG tablet Take 1,000 mg by mouth 2 times daily (with meals) Pt takes it in the AM and the evening.       SIMVASTATIN PO Take 40 mg by mouth At Bedtime         ROS:  4 point ROS including Respiratory, CV, GI and , other than that noted in the HPI,   is negative    Exam:  BP (!) 154/94  Pulse 79  Temp 97.8  F (36.6  C)  Resp 10  Wt 189 lb 6.4 oz (85.9 kg)  SpO2 94%  BMI 26.42 kg/m2  GENERAL APPEARANCE:  Alert, oriented, cooperative  EYES:  eyelids normal, sclera's clear.  eyes appears tired  NECK:  staples midline back of neck intact and no signs of infection.  steri strips in from horizontal position.  some are peeling away.  neck is purple with some yellowing of bruising.  can see by jaw like swelling still.  RESP:  no respiratory distress, diminished breath sounds in bases.  no adventitious sounds heard  ABDOMEN:  normal bowel sounds, soft, nontender, no hepatosplenomegaly or other masses, no guarding or rebound  M/S:   Gait and station abnormal assist of one with mobility, transfers with assist of one.  SKIN:  cool to touch and dry.  noted above of neck incisions  PSYCH:  oriented X 3, normal insight, judgement and memory, affect and mood normal, is in a lot of pain and so expect mood to differ and not be cooperative    Lab/Diagnostic data:  [facility]:  Hemoglobin a1c AM (order if not done w/in 3 mos) (08/03/2017 8:15 AM)  Hemoglobin a1c AM (order if not done w/in 3 mos) (08/03/2017 8:15 AM)   Component Value Ref Range   HEMOGLOBIN A1C MONITORING (POCT) 5.9 <=6.4 %     CBC WITH AUTO DIFFERENTIAL (08/02/2017 4:54 PM)  CBC WITH AUTO DIFFERENTIAL (08/02/2017 4:54 PM)   Component Value Ref Range   WHITE BLOOD COUNT  10.7 4.5 - 11.0 thou/cu mm   RED BLOOD COUNT  5.14 4.30 - 5.90 mil/cu mm   HEMOGLOBIN  14.4 13.5 - 17.5 g/dL   HEMATOCRIT  41.5 37.0 - 53.0 %   MCV  81 80 - 100 fL   MCH  28.0 26.0 - 34.0 pg   MCHC  34.7 32.0 - 36.0 g/dL   RDW  13.4 11.5 - 15.5 %   PLATELET COUNT  193 140 - 440 thou/cu mm   MPV  9.4 6.5 - 11.0 fL   NEUTROPHILS  87.3 (H) 42.0 - 72.0 %   LYMPHOCYTES  9.3 (L) 20.0 - 44.0 %   MONOCYTES  2.0 <12.0 %   EOSINOPHILS  1.0 <8.0 %   BASOPHILS  0.2 <3.0 %   IMMATURE GRANULOCYTES(METAS,MYELOS,PROS) 0.2 %   ABSOLUTE NEUTROPHILS  9.3 (H) 1.7 -  7.0 thou/cu mm   ABSOLUTE LYMPHOCYTES  1.0 0.9 - 2.9 thou/cu mm   ABSOLUTE MONOCYTES  0.2 <0.9 thou/cu mm   ABSOLUTE EOSINOPHILS  0.1 <0.5 thou/cu mm   ABSOLUTE BASOPHILS  0.0 <0.3 thou/cu mm   ABSOLUTE IMMATURE GRANULOCYTES(METAS,MYELOS,PROS) 0.0 <0.3 thou/cu mm     INR ASAP (08/02/2017 4:54 PM)  INR ASAP (08/02/2017 4:54 PM)   Component Value Ref Range   INR 1.1 <1.3   PROTIME 13.9 11.7 - 14.1 sec     BASIC METABOLIC PANEL (08/02/2017 4:54 PM)  BASIC METABOLIC PANEL (08/02/2017 4:54 PM)   Component Value Ref Range   SODIUM 137 135 - 145 mmol/L   POTASSIUM 3.9 3.5 - 5.0 mmol/L   CHLORIDE 99 98 - 110 mmol/L   CO2,TOTAL 26 21 - 31 mmol/L   ANION GAP 12 5 - 18    GLUCOSE 216 (H) 65 - 100 mg/dL   CALCIUM 10.0 8.5 - 10.5 mg/dL   BUN 22 8 - 25 mg/dL   CREATININE 0.95 0.72 - 1.25 mg/dL   BUN/CREAT RATIO  23 (H) 10 - 20    GFR if African American >60 >60 ml/min/1.73m2   GFR if not African American >60 >60 ml/min/1.73m2       ASSESSMENT/PLAN:  Spinal stenosis of cervical region  S/P cervical discectomy  - discussed pain medication as he came with oxycodone 5mg 1-3 tabs every 4 hours prn.  The hospital's pain scale is from 0-19.  Here at [facility] it is 1-10.  Asked Don what he wanted and it was the 3 tabs around the clock every 4 hours.  This NP did not like that idea and risk of over sedation.  Goal is for him to be comfortable and be alert.  Don will continue with Robaxin 750mg QID, vistaril 25mg at HS for sleep, Valium 5mg every 6 hours prn for muscle spasms.  Don is also on a tapering dose of dexamethasone.    1.  Will discontinue the current dosing of oxycodone.  Agreed upon 15mg 5x day scheduled.  6am, 10am, 2pm, 6pm and 10pm.  Can have 10mg every 4 hours prn.    Essential hypertension  Blood pressures currently are elevated.  120-170's/80-90's.   Will not change is lisinopril/HCTZ 20-12.5 daily.  Feel his pressures are elevated due to poor pain control at this time.     Type 2 diabetes mellitus with diabetic  polyneuropathy, without long-term current use of insulin (H)  Remains on Metformin 1000mg twice a day.    Drug-induced constipation  Will clarify Senna Plus order of 1-2 tabs twice a day to:  Senna Plus 2 tabs twice a day given the oxycodone order.      Hyperlipidemia LDL goal <100  Remains on Simvastatin 20mg at HS.  No labs to be ordered.       Orders:  See above      Total time spent with patient visit at the skilled nursing facility was 35 min including patient visit and review of past records. Greater than 50% of total time spent with counseling and coordinating care due to pain control and importance of participation of his cares    Electronically signed by:  MIKE Fontenot CNP

## 2017-08-29 ENCOUNTER — NURSING HOME VISIT (OUTPATIENT)
Dept: GERIATRICS | Facility: CLINIC | Age: 67
End: 2017-08-29
Payer: COMMERCIAL

## 2017-08-29 ENCOUNTER — HOSPITAL LABORATORY (OUTPATIENT)
Dept: NURSING HOME | Facility: OTHER | Age: 67
End: 2017-08-29

## 2017-08-29 VITALS
SYSTOLIC BLOOD PRESSURE: 126 MMHG | RESPIRATION RATE: 18 BRPM | WEIGHT: 181 LBS | HEART RATE: 117 BPM | TEMPERATURE: 98.8 F | DIASTOLIC BLOOD PRESSURE: 86 MMHG | OXYGEN SATURATION: 98 % | BODY MASS INDEX: 25.24 KG/M2

## 2017-08-29 DIAGNOSIS — Z98.890 S/P CERVICAL DISCECTOMY: ICD-10-CM

## 2017-08-29 DIAGNOSIS — T50.905A SEDATED DUE TO MEDICATION: ICD-10-CM

## 2017-08-29 DIAGNOSIS — R11.2 NON-INTRACTABLE VOMITING WITH NAUSEA, UNSPECIFIED VOMITING TYPE: Primary | ICD-10-CM

## 2017-08-29 DIAGNOSIS — M48.02 SPINAL STENOSIS OF CERVICAL REGION: ICD-10-CM

## 2017-08-29 DIAGNOSIS — R40.4 SEDATED DUE TO MEDICATION: ICD-10-CM

## 2017-08-29 LAB
ANION GAP SERPL CALCULATED.3IONS-SCNC: 13 MMOL/L (ref 3–14)
BASOPHILS # BLD AUTO: 0 10E9/L (ref 0–0.2)
BASOPHILS NFR BLD AUTO: 0.2 %
BUN SERPL-MCNC: 41 MG/DL (ref 7–30)
CALCIUM SERPL-MCNC: 9.5 MG/DL (ref 8.5–10.1)
CHLORIDE SERPL-SCNC: 95 MMOL/L (ref 94–109)
CO2 SERPL-SCNC: 27 MMOL/L (ref 20–32)
CREAT SERPL-MCNC: 1.78 MG/DL (ref 0.66–1.25)
DIFFERENTIAL METHOD BLD: ABNORMAL
EOSINOPHIL # BLD AUTO: 0.2 10E9/L (ref 0–0.7)
EOSINOPHIL NFR BLD AUTO: 1.6 %
ERYTHROCYTE [DISTWIDTH] IN BLOOD BY AUTOMATED COUNT: 14 % (ref 10–15)
GFR SERPL CREATININE-BSD FRML MDRD: 38 ML/MIN/1.7M2
GLUCOSE SERPL-MCNC: 117 MG/DL (ref 70–99)
HCT VFR BLD AUTO: 39.8 % (ref 40–53)
HGB BLD-MCNC: 13.5 G/DL (ref 13.3–17.7)
IMM GRANULOCYTES # BLD: 0.1 10E9/L (ref 0–0.4)
IMM GRANULOCYTES NFR BLD: 0.9 %
LYMPHOCYTES # BLD AUTO: 2.5 10E9/L (ref 0.8–5.3)
LYMPHOCYTES NFR BLD AUTO: 19.3 %
MCH RBC QN AUTO: 27.6 PG (ref 26.5–33)
MCHC RBC AUTO-ENTMCNC: 33.9 G/DL (ref 31.5–36.5)
MCV RBC AUTO: 81 FL (ref 78–100)
MONOCYTES # BLD AUTO: 1.4 10E9/L (ref 0–1.3)
MONOCYTES NFR BLD AUTO: 10.7 %
NEUTROPHILS # BLD AUTO: 8.7 10E9/L (ref 1.6–8.3)
NEUTROPHILS NFR BLD AUTO: 67.3 %
PLATELET # BLD AUTO: 322 10E9/L (ref 150–450)
POTASSIUM SERPL-SCNC: 4.2 MMOL/L (ref 3.4–5.3)
RBC # BLD AUTO: 4.89 10E12/L (ref 4.4–5.9)
SODIUM SERPL-SCNC: 135 MMOL/L (ref 133–144)
WBC # BLD AUTO: 12.8 10E9/L (ref 4–11)

## 2017-08-29 PROCEDURE — 99309 SBSQ NF CARE MODERATE MDM 30: CPT | Performed by: NURSE PRACTITIONER

## 2017-08-29 NOTE — PROGRESS NOTES
[facility]    Chief Complaint   Patient presents with     Nursing Home Acute     Fatigue     Vomiting       HPI:    Don Redd is a 66 year old  (1950), who is being seen today for an episodic care visit at [facility].    HPI information obtained from: facility chart records, facility staff, patient report and [facility] chart review. Today's concern is:    1. Non-intractable vomiting with nausea, unspecified vomiting type    2. Sedated due to medication    3. Spinal stenosis of cervical region    4. S/P cervical discectomy      Staff updated NP today that Tyrell was vomiting and over sedated most likely from the oxycodone 15mg 5x day.  Still saying today pain is at 9/10.  Came to see him in his room and he was up in w/c.  Able to hold head up.  Skin cool and clammy.  Tan/yellow in color for vomiting with no particular odor.  Able to answer questions.  Took morning medications with fluids.  Refused to eat breakfast and lunch.  Offered soup and broth with refusing.  Had offered to send in to the ED and he refused.  Message left for spouse for update.    REVIEW OF SYSTEMS:  Answered a few questions.      /86  Pulse 117  Temp 98.8  F (37.1  C)  Resp 18  Wt 181 lb (82.1 kg)  SpO2 98%  BMI 25.24 kg/m2  GENERAL APPEARANCE:  Alert, in no immediate distress  Facial bruising due to surgery.  Left orbital area bruised.  Steri strips in front of neck and staples in back of neck.  Slight swelling around jaw line with bruising that is yellow/purple.  Heart rate regular and fast.   Skin cool and clammy and shinny in appearance.     Later on in afternoon, he was laying down.  Had a shower and felt better.  zofran was give and stated he felt better in his stomach.  Taking sips of fluid when offered.  Skin cool but not clammy at 2nd visit.      ASSESSMENT/PLAN:  1. Non-intractable vomiting with nausea, unspecified vomiting type    2. Sedated due to medication    3. Spinal stenosis of  cervical region    4. S/P cervical discectomy      Will watch for now and if changes can go in.  1.  Zofran 4mg every 6 hours prn for vomiting.  2.  CBC and BMP today due to vomiting and sedation  3.  Discontinue current orders of oxycodone  4.  Start oxycodone 10mg 4x day and 10mg every 4 hours prn.  Hold if sedated.      Total time spent with patient visit at the skilled nursing facility was 25 min including patient visit and review of past records. Greater than 50% of total time spent with counseling and coordinating care due to change of condition    Hyacinth Perez, MIKE CNP

## 2017-08-29 NOTE — MR AVS SNAPSHOT
"              After Visit Summary   2017    Don Redd    MRN: [id number]           Patient Information     Date Of Birth          1950        Visit Information        Provider Department      2017 11:00 AM Hyacinth Perez APRN CNP Geriatrics Transitional Care        Today's Diagnoses     Non-intractable vomiting with nausea, unspecified vomiting type    -  1    Sedated due to medication        Spinal stenosis of cervical region        S/P cervical discectomy           Follow-ups after your visit        Who to contact     If you have questions or need follow up information about today's clinic visit or your schedule please contact GERIATRICS TRANSITIONAL CARE directly at [phone].  Normal or non-critical lab and imaging results will be communicated to you by the Shelfhart, letter or phone within 4 business days after the clinic has received the results. If you do not hear from us within 7 days, please contact the clinic through the Shelfhart or phone. If you have a critical or abnormal lab result, we will notify you by phone as soon as possible.  Submit refill requests through Gl[employer]yBox or call your pharmacy and they will forward the refill request to us. Please allow 3 business days for your refill to be completed.          Additional Information About Your Visit        MyChart Information     Gloss[employer]Box lets you send messages to your doctor, view your test results, renew your prescriptions, schedule appointments and more. To sign up, go to [email or web address] . Click on \"Log in\" on the left side of the screen, which will take you to the Welcome page. Then click on \"Sign up Now\" on the right side of the page.     You will be asked to enter the access code listed below, as well as some personal information. Please follow the directions to create your username and password.     Your access code is: [email or web address]  Expires: 2017  3:00 PM     Your access code will  in 90 days. If you need " help or a new code, please call your [facility] or [phone].        Care EveryWhere ID     This is your Care EveryWhere ID. This could be used by other organizations to access your [facility] medical records  [id number]        Your Vitals Were     Pulse Temperature Respirations Pulse Oximetry BMI (Body Mass Index)       117 98.8  F (37.1  C) 18 98% 25.24 kg/m2        Blood Pressure from Last 3 Encounters:   08/30/17 90/54   08/30/17 141/76   08/29/17 126/86    Weight from Last 3 Encounters:   08/30/17 169 lb (76.7 kg)   08/30/17 181 lb (82.1 kg)   08/29/17 181 lb (82.1 kg)              Today, you had the following     No orders found for display       Primary Care Provider Office Phone # Fax #    [facility] [place] [phone]       [facility] [address]  [facility] [address]        Equal Access to Services     JOSHUA GUTHRIE : Hadii aad ku hadasho Soomaali, waaxda luqadaha, qaybta kaalmada adeegyada, waxay idiin hayharleyn ruddy haley . So [facility] [phone].    ATENCIÓN: Si habla español, tiene a blanco disposición servicios gratuitos de asistencia lingüística. Llame al [phone].    We comply with applicable federal civil rights laws and [place] laws. We do not discriminate on the basis of race, color, national origin, age, disability sex, sexual orientation or gender identity.            Thank you!     Thank you for choosing GERIATRICS TRANSITIONAL CARE  for your care. Our goal is always to provide you with excellent care. Hearing back from our patients is one way we can continue to improve our services. Please take a few minutes to complete the written survey that you may receive in the mail after your visit with us. Thank you!             Your Updated Medication List - Protect others around you: Learn how to safely use, store and throw away your medicines at www.disposemymeds.org.          This list is accurate as of: 8/29/17 11:59 PM.  Always use your most recent med  list.                   Brand Name Dispense Instructions for use Diagnosis    ASPIRIN PO      Take 81 mg by mouth daily        DEXAMETHASONE PO      Take 2 mg by mouth See Admin Instructions        DIAZEPAM PO      Take 5 mg by mouth every 6 hours as needed for anxiety        FAMOTIDINE PO      Take 20 mg by mouth 2 times daily        GABAPENTIN PO      Take 600 mg by mouth 2 times daily        HYDROXYZINE PAMOATE PO      Take 25 mg by mouth At Bedtime        lisinopril-hydrochlorothiazide 20-12.5 MG per tablet    PRINZIDE/ZESTORETIC    30 tablet    Take 2 tablets by mouth daily        metFORMIN 500 MG tablet    GLUCOPHAGE     Take 1,000 mg by mouth 2 times daily (with meals) Pt takes it in the AM and the evening.        METHOCARBAMOL PO      Take 750 mg by mouth 4 times daily        NIZORAL 2 % shampoo   Generic drug:  ketoconazole      Apply topically daily as needed for itching or irritation (also 2x weeklly)        OXYCODONE HCL PO      Take 5-15 mg by mouth every 4 hours as needed        polyethylene glycol powder    MIRALAX/GLYCOLAX     Take 17 g by mouth daily        senna-docusate 8.6-50 MG per tablet    SENOKOT-S;PERICOLACE     Take 1-2 tablets by mouth 2 times daily        SIMVASTATIN PO      Take 40 mg by mouth At Bedtime        Vitamin D-3 1000 UNITS Caps      Take 1,000 Units by mouth daily

## 2017-08-30 ENCOUNTER — HOSPITAL ENCOUNTER (INPATIENT)
Facility: CLINIC | Age: 67
LOS: 2 days | Discharge: NURSING, REHAB OR LONG-TERM CARE | DRG: 682 | End: 2017-09-01
Attending: EMERGENCY MEDICINE | Admitting: FAMILY MEDICINE
Payer: COMMERCIAL

## 2017-08-30 ENCOUNTER — NURSING HOME VISIT (OUTPATIENT)
Dept: GERIATRICS | Facility: CLINIC | Age: 67
End: 2017-08-30
Payer: COMMERCIAL

## 2017-08-30 ENCOUNTER — HOSPITAL LABORATORY (OUTPATIENT)
Dept: NURSING HOME | Facility: OTHER | Age: 67
End: 2017-08-30

## 2017-08-30 VITALS
OXYGEN SATURATION: 90 % | SYSTOLIC BLOOD PRESSURE: 141 MMHG | BODY MASS INDEX: 25.24 KG/M2 | WEIGHT: 181 LBS | HEART RATE: 71 BPM | RESPIRATION RATE: 18 BRPM | DIASTOLIC BLOOD PRESSURE: 76 MMHG | TEMPERATURE: 97.9 F

## 2017-08-30 DIAGNOSIS — Z98.890 S/P CERVICAL DISCECTOMY: ICD-10-CM

## 2017-08-30 DIAGNOSIS — R40.4 SEDATED DUE TO MEDICATION: ICD-10-CM

## 2017-08-30 DIAGNOSIS — K59.00 CONSTIPATION, UNSPECIFIED CONSTIPATION TYPE: ICD-10-CM

## 2017-08-30 DIAGNOSIS — T50.905A SEDATED DUE TO MEDICATION: ICD-10-CM

## 2017-08-30 DIAGNOSIS — N17.9 AKI (ACUTE KIDNEY INJURY) (H): ICD-10-CM

## 2017-08-30 DIAGNOSIS — E86.0 DEHYDRATION, MODERATE: Primary | ICD-10-CM

## 2017-08-30 DIAGNOSIS — M48.02 SPINAL STENOSIS OF CERVICAL REGION: ICD-10-CM

## 2017-08-30 DIAGNOSIS — E86.0 DEHYDRATION: ICD-10-CM

## 2017-08-30 DIAGNOSIS — M48.02 SPINAL STENOSIS OF CERVICAL REGION: Primary | ICD-10-CM

## 2017-08-30 PROBLEM — G93.40 ENCEPHALOPATHY: Status: ACTIVE | Noted: 2017-08-30

## 2017-08-30 LAB
ANION GAP SERPL CALCULATED.3IONS-SCNC: 15 MMOL/L (ref 3–14)
BASOPHILS # BLD AUTO: 0 10E9/L (ref 0–0.2)
BASOPHILS NFR BLD AUTO: 0.1 %
BUN SERPL-MCNC: 64 MG/DL (ref 7–30)
CALCIUM SERPL-MCNC: 9.6 MG/DL (ref 8.5–10.1)
CHLORIDE SERPL-SCNC: 90 MMOL/L (ref 94–109)
CO2 SERPL-SCNC: 28 MMOL/L (ref 20–32)
CREAT SERPL-MCNC: 3.26 MG/DL (ref 0.66–1.25)
DIFFERENTIAL METHOD BLD: NORMAL
EOSINOPHIL # BLD AUTO: 0.1 10E9/L (ref 0–0.7)
EOSINOPHIL NFR BLD AUTO: 1.2 %
ERYTHROCYTE [DISTWIDTH] IN BLOOD BY AUTOMATED COUNT: 14.1 % (ref 10–15)
GFR SERPL CREATININE-BSD FRML MDRD: 19 ML/MIN/1.7M2
GLUCOSE BLDC GLUCOMTR-MCNC: 130 MG/DL (ref 70–99)
GLUCOSE SERPL-MCNC: 151 MG/DL (ref 70–99)
HCT VFR BLD AUTO: 40.6 % (ref 40–53)
HGB BLD-MCNC: 13.8 G/DL (ref 13.3–17.7)
IMM GRANULOCYTES # BLD: 0.1 10E9/L (ref 0–0.4)
IMM GRANULOCYTES NFR BLD: 0.7 %
LYMPHOCYTES # BLD AUTO: 1.5 10E9/L (ref 0.8–5.3)
LYMPHOCYTES NFR BLD AUTO: 13.8 %
MCH RBC QN AUTO: 27.7 PG (ref 26.5–33)
MCHC RBC AUTO-ENTMCNC: 34 G/DL (ref 31.5–36.5)
MCV RBC AUTO: 81 FL (ref 78–100)
MONOCYTES # BLD AUTO: 1 10E9/L (ref 0–1.3)
MONOCYTES NFR BLD AUTO: 9.1 %
NEUTROPHILS # BLD AUTO: 8.1 10E9/L (ref 1.6–8.3)
NEUTROPHILS NFR BLD AUTO: 75.1 %
PLATELET # BLD AUTO: 312 10E9/L (ref 150–450)
POTASSIUM SERPL-SCNC: 4.2 MMOL/L (ref 3.4–5.3)
RBC # BLD AUTO: 4.99 10E12/L (ref 4.4–5.9)
SODIUM SERPL-SCNC: 133 MMOL/L (ref 133–144)
WBC # BLD AUTO: 10.7 10E9/L (ref 4–11)

## 2017-08-30 PROCEDURE — 99285 EMERGENCY DEPT VISIT HI MDM: CPT | Mod: Z6 | Performed by: EMERGENCY MEDICINE

## 2017-08-30 PROCEDURE — 99285 EMERGENCY DEPT VISIT HI MDM: CPT | Mod: 25 | Performed by: EMERGENCY MEDICINE

## 2017-08-30 PROCEDURE — 99223 1ST HOSP IP/OBS HIGH 75: CPT | Mod: AI | Performed by: FAMILY MEDICINE

## 2017-08-30 PROCEDURE — 12000000 ZZH R&B MED SURG/OB

## 2017-08-30 PROCEDURE — 99207 ZZC CDG-MDM COMPONENT: MEETS MODERATE - UP CODED: CPT | Performed by: FAMILY MEDICINE

## 2017-08-30 PROCEDURE — 96361 HYDRATE IV INFUSION ADD-ON: CPT | Performed by: EMERGENCY MEDICINE

## 2017-08-30 PROCEDURE — 96360 HYDRATION IV INFUSION INIT: CPT | Performed by: EMERGENCY MEDICINE

## 2017-08-30 PROCEDURE — 25800025 ZZH RX 258: Performed by: EMERGENCY MEDICINE

## 2017-08-30 PROCEDURE — 00000146 ZZHCL STATISTIC GLUCOSE BY METER IP

## 2017-08-30 PROCEDURE — 25000128 H RX IP 250 OP 636: Performed by: EMERGENCY MEDICINE

## 2017-08-30 PROCEDURE — 99309 SBSQ NF CARE MODERATE MDM 30: CPT | Performed by: NURSE PRACTITIONER

## 2017-08-30 PROCEDURE — 25000128 H RX IP 250 OP 636: Performed by: FAMILY MEDICINE

## 2017-08-30 PROCEDURE — 25000132 ZZH RX MED GY IP 250 OP 250 PS 637: Performed by: FAMILY MEDICINE

## 2017-08-30 RX ORDER — FAMOTIDINE 20 MG/1
20 TABLET, FILM COATED ORAL 2 TIMES DAILY
Status: DISCONTINUED | OUTPATIENT
Start: 2017-08-30 | End: 2017-09-01 | Stop reason: HOSPADM

## 2017-08-30 RX ORDER — OXYCODONE HYDROCHLORIDE 5 MG/1
5 TABLET ORAL EVERY 4 HOURS PRN
Status: DISCONTINUED | OUTPATIENT
Start: 2017-08-30 | End: 2017-09-01 | Stop reason: HOSPADM

## 2017-08-30 RX ORDER — DEXTROSE MONOHYDRATE, SODIUM CHLORIDE, AND POTASSIUM CHLORIDE 50; 1.49; 4.5 G/1000ML; G/1000ML; G/1000ML
INJECTION, SOLUTION INTRAVENOUS CONTINUOUS
Status: DISCONTINUED | OUTPATIENT
Start: 2017-08-30 | End: 2017-08-30

## 2017-08-30 RX ORDER — SODIUM CHLORIDE 9 MG/ML
INJECTION, SOLUTION INTRAVENOUS CONTINUOUS
Status: DISCONTINUED | OUTPATIENT
Start: 2017-08-30 | End: 2017-08-30

## 2017-08-30 RX ORDER — NALOXONE HYDROCHLORIDE 0.4 MG/ML
.1-.4 INJECTION, SOLUTION INTRAMUSCULAR; INTRAVENOUS; SUBCUTANEOUS
Status: DISCONTINUED | OUTPATIENT
Start: 2017-08-30 | End: 2017-09-01 | Stop reason: HOSPADM

## 2017-08-30 RX ORDER — POLYETHYLENE GLYCOL 3350 17 G/17G
17 POWDER, FOR SOLUTION ORAL DAILY
Status: DISCONTINUED | OUTPATIENT
Start: 2017-08-31 | End: 2017-09-01 | Stop reason: HOSPADM

## 2017-08-30 RX ORDER — NICOTINE POLACRILEX 4 MG
15-30 LOZENGE BUCCAL
Status: DISCONTINUED | OUTPATIENT
Start: 2017-08-30 | End: 2017-09-01 | Stop reason: HOSPADM

## 2017-08-30 RX ORDER — AMOXICILLIN 250 MG
2 CAPSULE ORAL 2 TIMES DAILY
Status: DISCONTINUED | OUTPATIENT
Start: 2017-08-30 | End: 2017-09-01 | Stop reason: HOSPADM

## 2017-08-30 RX ORDER — SODIUM CHLORIDE 9 MG/ML
INJECTION, SOLUTION INTRAVENOUS CONTINUOUS
Status: DISCONTINUED | OUTPATIENT
Start: 2017-08-30 | End: 2017-09-01

## 2017-08-30 RX ORDER — DEXTROSE MONOHYDRATE 25 G/50ML
25-50 INJECTION, SOLUTION INTRAVENOUS
Status: DISCONTINUED | OUTPATIENT
Start: 2017-08-30 | End: 2017-09-01 | Stop reason: HOSPADM

## 2017-08-30 RX ORDER — LIDOCAINE 40 MG/G
CREAM TOPICAL
Status: DISCONTINUED | OUTPATIENT
Start: 2017-08-30 | End: 2017-09-01 | Stop reason: HOSPADM

## 2017-08-30 RX ADMIN — SODIUM CHLORIDE: 9 INJECTION, SOLUTION INTRAVENOUS at 21:47

## 2017-08-30 RX ADMIN — FAMOTIDINE 20 MG: 20 TABLET ORAL at 21:44

## 2017-08-30 RX ADMIN — POTASSIUM CHLORIDE, DEXTROSE MONOHYDRATE AND SODIUM CHLORIDE: 150; 5; 450 INJECTION, SOLUTION INTRAVENOUS at 18:39

## 2017-08-30 RX ADMIN — SENNOSIDES AND DOCUSATE SODIUM 2 TABLET: 8.6; 5 TABLET ORAL at 21:44

## 2017-08-30 RX ADMIN — SODIUM CHLORIDE 2000 ML: 9 INJECTION, SOLUTION INTRAVENOUS at 15:48

## 2017-08-30 RX ADMIN — SODIUM CHLORIDE: 9 INJECTION, SOLUTION INTRAVENOUS at 16:51

## 2017-08-30 ASSESSMENT — ENCOUNTER SYMPTOMS
COLOR CHANGE: 1
TROUBLE SWALLOWING: 1
WOUND: 1
FEVER: 0

## 2017-08-30 ASSESSMENT — ACTIVITIES OF DAILY LIVING (ADL)
COGNITION: 2 - DIFFICULTY WITH ORGANIZING THOUGHTS
RETIRED_EATING: 2-->ASSISTIVE PERSON
TOILETING: 3-->ASSISTIVE EQUIPMENT AND PERSON
NUMBER_OF_TIMES_PATIENT_HAS_FALLEN_WITHIN_LAST_SIX_MONTHS: 2
DRESS: 3-->ASSISTIVE EQUIPMENT AND PERSON
BATHING: 3-->ASSISTIVE EQUIPMENT AND PERSON
TRANSFERRING: 3-->ASSISTIVE EQUIPMENT AND PERSON
RETIRED_COMMUNICATION: 2-->DIFFICULTY SPEAKING (NOT RELATED TO LANGUAGE BARRIER)
SWALLOWING: 0-->SWALLOWS FOODS/LIQUIDS WITHOUT DIFFICULTY
FALL_HISTORY_WITHIN_LAST_SIX_MONTHS: YES
AMBULATION: 3-->ASSISTIVE EQUIPMENT AND PERSON

## 2017-08-30 NOTE — PROGRESS NOTES
[facility]    Chief Complaint   Patient presents with     Nursing Home Acute       HPI:    Don Redd is a 66 year old  (1950), who is being seen today for an episodic care visit at [facility].    HPI information obtained from: facility chart records, facility staff, patient report and [facility] chart review. Today's concern is:     1. Dehydration, moderate    2. Sedated due to medication    3. Spinal stenosis of cervical region    4. S/P cervical discectomy    - came to see Tyrell today after having vomiting and sedation yesterday.  Most likely the oxycodone medication caused him over sedation in accumalation.  Had lowered his oxycodone from 15mg 5x day scheduled to 10mg QID but still holding medication based on his alertness.  Yesterday labs were taken and encouraged fluids since by the afternoon he was more alert and drinking after Zofran given.    This morning, staff gave him 10mg of oxycodone at 9am.  Still was saying pain at a 9/10  At lunch time he came out to eat lunch but stopped him by the desk to speak with him.  Pain more under control at around a 4/10.  Still does not feel well.  No vomiting today.  Able to answer questions.  Labs drawn after lunch time - BMP and CBC.  Labs came back showing worsening kidney function.  Gave him option of going into ED or stay here for IV therapy.      REVIEW OF SYSTEMS:  4 point ROS including Respiratory, CV, GI and , other than that noted in the HPI,  is negative    /76  Pulse 71  Temp 97.9  F (36.6  C)  Resp 18  Wt 181 lb (82.1 kg)  SpO2 90%  BMI 25.24 kg/m2  GENERAL APPEARANCE:  Alert, in no immediate distress, went out to lunch and able to cut his meat and chew food.  No coughing.  He actually dropped something on the floor and went to retrieve it and staff attempted to halt him so he did not fall over.  Heart rate regular   No trouble breathing.  Voice is sometimes to hear.  Staples in back of neck intact.  Steri  strips in front of neck.    Component      Latest Ref Rng & Units 8/29/2017 8/30/2017   Sodium      133 - 144 mmol/L 135 133   Potassium      3.4 - 5.3 mmol/L 4.2 4.2   Chloride      94 - 109 mmol/L 95 90 (L)   Carbon Dioxide      20 - 32 mmol/L 27 28   Anion Gap      3 - 14 mmol/L 13 15 (H)   Glucose      70 - 99 mg/dL 117 (H) 151 (H)   Urea Nitrogen      7 - 30 mg/dL 41 (H) 64 (H)   Creatinine      0.66 - 1.25 mg/dL 1.78 (H) 3.26 (H)   GFR Estimate      >60 mL/min/1.7m2 38 (L) 19 (L)   GFR Estimate If Black      >60 mL/min/1.7m2 47 (L) 23 (L)   Calcium      8.5 - 10.1 mg/dL 9.5 9.6     Component      Latest Ref Rng & Units 8/29/2017 8/30/2017   WBC      4.0 - 11.0 10e9/L 12.8 (H) 10.7   RBC Count      4.4 - 5.9 10e12/L 4.89 4.99   Hemoglobin      13.3 - 17.7 g/dL 13.5 13.8   Hematocrit      40.0 - 53.0 % 39.8 (L) 40.6   MCV      78 - 100 fl 81 81   MCH      26.5 - 33.0 pg 27.6 27.7   MCHC      31.5 - 36.5 g/dL 33.9 34.0   RDW      10.0 - 15.0 % 14.0 14.1   Platelet Count      150 - 450 10e9/L 322 312   Diff Method       Automated Method Automated Method   % Neutrophils      % 67.3 75.1   % Lymphocytes      % 19.3 13.8   % Monocytes      % 10.7 9.1   % Eosinophils      % 1.6 1.2   % Basophils      % 0.2 0.1   % Immature Granulocytes      % 0.9 0.7   Absolute Neutrophil      1.6 - 8.3 10e9/L 8.7 (H) 8.1   Absolute Lymphocytes      0.8 - 5.3 10e9/L 2.5 1.5   Absolute Monocytes      0.0 - 1.3 10e9/L 1.4 (H) 1.0   Absolute Eosinophils      0.0 - 0.7 10e9/L 0.2 0.1   Absolute Basophils      0.0 - 0.2 10e9/L 0.0 0.0   Abs Immature Granulocytes      0 - 0.4 10e9/L 0.1 0.1       ASSESSMENT/PLAN:  1. Dehydration, moderate    2. Sedated due to medication    3. Spinal stenosis of cervical region    4. S/P cervical discectomy    - gave Tyrell a choice of going to the ED for evaluation of over sedation and dehydration, acute kidney injury or stay here for IVs, removal of metformin and ACE inhibitor and HCTZ combo.  Today, Tyrell  chose to go to the ED for evaluation.  Was going to to lower the oxycodone to 5mg QID and then 5mg every 4 hours prn, but order not written yet.    1.  Send to ED for evaluation  2.  Called the ED for report  3.  Called spouse to update after EMTs took to ED.  Don had called spouse as well after he made decision to go to the ED.    Total time spent with patient visit at the skilled nursing facility was 35 min including patient visit, review of past records and phone call to patient contact. Greater than 50% of total time spent with counseling and coordinating care due to change in condition    Hyacinth Perez, APRN CNP

## 2017-08-30 NOTE — IP AVS SNAPSHOT
MRN:[id number]                      After Visit Summary   8/30/2017    Don Redd    MRN: [id number]           Thank you!     Thank you for choosing [facility] for your care. Our goal is always to provide you with excellent care. Hearing back from our patients is one way we can continue to improve our services. Please take a few minutes to complete the written survey that you may receive in the mail after you visit with us. Thank you!        Patient Information     Date Of Birth          1950        Designated Caregiver       Most Recent Value    Caregiver    Will someone help with your care after discharge? yes    Name of designated caregiver Lidia    Phone number of caregiver [phone]    Caregiver address [address] [place] [address]      About your hospital stay     You were admitted on:  August 30, 2017 You last received care in the:  [address] [facility]    You were discharged on:  September 1, 2017        Reason for your hospital stay       Acute kidney injury caused by dehydration and sedation, most likely because of the medications given for pain after the surgery causing sleepiness and confusion.  These medications have been stopped and your pain has been well controlled with tylenol alone.  Your kidney function has returned to normal with IV fluids given during your stay.  Please continue to drink a lot of fluids for the next week to make sure your kidneys continue to be well hydrated as they recover.                  Who to Call     For medical emergencies, please call 911.  For non-urgent questions about your medical care, please call your primary care provider or clinic, [phone]          Attending Provider     Provider Specialty    Reji Santamaria MD Emergency Medicine    Arnaldo Farr MD Family Practice       Primary Care Provider Office Phone # Fax #    Evelio MELVIN Luo [phone]      After Care Instructions      "Activity - Up with nursing assistance           Advance Diet as Tolerated       Follow this diet upon discharge: Moderate consistent carbohydrate (5096-6707 nata / 4-6 CHO units per meal)            General info for SNF       Length of Stay Estimate: Short Term Care: Estimated # of Days <30  Condition at Discharge: Improving  Level of care:skilled   Rehabilitation Potential: Good  Admission H&P remains valid and up-to-date: Yes  Recent Chemotherapy: N/A  Use Nursing Home Standing Orders: Yes            Glucose monitor nursing POCT       BID - before breakfast and before supper            Mantoux instructions       Give two-step Mantoux (PPD) Per Facility Policy Yes                  Follow-up Appointments     Follow Up and recommended labs and tests       Follow up with Nursing home physician as needed  Follow up with surgeon as scheduled                  Additional Services     Occupational Therapy Adult Consult       Evaluate and treat as clinically indicated.    Reason:  S/p cervical spine surgery            Physical Therapy Adult Consult       Evaluate and treat as clinically indicated.    Reason:  S/p Cervical spine surgery                  Pending Results     No orders found from 8/28/2017 to 8/31/2017.            Statement of Approval     Ordered          09/01/17 1059  I have reviewed and agree with all the recommendations and orders detailed in this document.  EFFECTIVE NOW     Approved and electronically signed by:  Cassi Miranda MD             Admission Information     Date & Time Provider Department Dept. Phone    8/30/2017 Arnaldo Farr MD [address] [facility] [phone]      Your Vitals Were     Blood Pressure Pulse Temperature Respirations Height Weight    177/83 (BP Location: Right arm) 72 96.4  F (35.8  C) (Oral) 16 1.803 m (5' 10.98\") 85 kg (187 lb 6.3 oz)    Pulse Oximetry BMI (Body Mass Index)                99% 26.15 kg/m2          MyChart Information     " "[employer] lets you send messages to your doctor, view your test results, renew your prescriptions, schedule appointments and more. To sign up, go to [email or web address] . Click on \"Log in\" on the left side of the screen, which will take you to the Welcome page. Then click on \"Sign up Now\" on the right side of the page.     You will be asked to enter the access code listed below, as well as some personal information. Please follow the directions to create your username and password.     Your access code is: [email or web address]  Expires: 2017  3:00 PM     Your access code will  in 90 days. If you need help or a new code, please call your [place] clinic or [phone].        Care EveryWhere ID     This is your Care EveryWhere ID. This could be used by other organizations to access your [place] medical records  [id number]        Equal Access to Services     JOSHUA GUTHRIE AH: Verito messinao Soarlen, waaxda lutim, qaybta kaalmada adecandice, dinorah haley . So [facility] [phone].    ATENCIÓN: Si prasanna dunlap, tiene a blanco disposición servicios gratuitos de asistencia lingüística. Alicia al [phone].    We comply with applicable federal civil rights laws and [place] laws. We do not discriminate on the basis of race, color, national origin, age, disability sex, sexual orientation or gender identity.               Review of your medicines      START taking        Dose / Directions    acetaminophen 325 MG tablet   Commonly known as:  TYLENOL   Used for:  Spinal stenosis of cervical region        Dose:  650 mg   Take 2 tablets (650 mg) by mouth every 4 hours as needed for mild pain   Quantity:  100 tablet   Refills:  0         CONTINUE these medicines which may have CHANGED, or have new prescriptions. If we are uncertain of the size of tablets/capsules you have at home, strength may be listed as something that might have changed.        Dose / Directions    oxyCODONE 5 MG IR " tablet   Commonly known as:  ROXICODONE   This may have changed:    - medication strength  - how much to take  - reasons to take this   Used for:  Spinal stenosis of cervical region        Dose:  2.5 mg   Take 0.5 tablets (2.5 mg) by mouth every 4 hours as needed for moderate to severe pain   Quantity:  20 tablet   Refills:  0       senna-docusate 8.6-50 MG per tablet   Commonly known as:  SENOKOT-S;PERICOLACE   This may have changed:    - how much to take  - when to take this  - reasons to take this   Used for:  Constipation, unspecified constipation type        Dose:  1 tablet   Take 1 tablet by mouth 2 times daily as needed for constipation   Quantity:  100 tablet   Refills:  0         CONTINUE these medicines which have NOT CHANGED        Dose / Directions    ASPIRIN PO        Dose:  81 mg   Take 81 mg by mouth daily   Refills:  0       DEXAMETHASONE PO        Dose:  2 mg   Take 2 mg by mouth See Admin Instructions   Refills:  0       lisinopril-hydrochlorothiazide 20-12.5 MG per tablet   Commonly known as:  PRINZIDE/ZESTORETIC        Dose:  2 tablet   Take 2 tablets by mouth daily   Quantity:  30 tablet   Refills:  0       metFORMIN 500 MG tablet   Commonly known as:  GLUCOPHAGE        Dose:  1000 mg   Take 1,000 mg by mouth 2 times daily (with meals) Pt takes it in the AM and the evening.   Refills:  0       NIZORAL 2 % shampoo   Generic drug:  ketoconazole        Apply topically daily as needed for itching or irritation (also 2x weeklly)   Refills:  0       polyethylene glycol powder   Commonly known as:  MIRALAX/GLYCOLAX        Dose:  17 g   Take 17 g by mouth daily   Refills:  0       SIMVASTATIN PO        Dose:  40 mg   Take 40 mg by mouth At Bedtime   Refills:  0       Vitamin D-3 1000 UNITS Caps        Dose:  1000 Units   Take 1,000 Units by mouth daily   Refills:  0       ZOFRAN PO        Dose:  4 mg   Take 4 mg by mouth every 6 hours as needed for nausea or vomiting   Refills:  0         STOP taking      DIAZEPAM PO           FAMOTIDINE PO           GABAPENTIN PO           HYDROXYZINE PAMOATE PO           METHOCARBAMOL PO                Where to get your medicines      Some of these will need a paper prescription and others can be bought over the counter. Ask your nurse if you have questions.     Bring a paper prescription for each of these medications     oxyCODONE 5 MG IR tablet       You don't need a prescription for these medications     acetaminophen 325 MG tablet    senna-docusate 8.6-50 MG per tablet                Protect others around you: Learn how to safely use, store and throw away your medicines at www.disposemymeds.org.             Medication List: This is a list of all your medications and when to take them. Check marks below indicate your daily home schedule. Keep this list as a reference.      Medications           Morning Afternoon Evening Bedtime As Needed    acetaminophen 325 MG tablet   Commonly known as:  TYLENOL   Take 2 tablets (650 mg) by mouth every 4 hours as needed for mild pain   Last time this was given:  650 mg on 9/1/2017  8:10 AM                                ASPIRIN PO   Take 81 mg by mouth daily                                DEXAMETHASONE PO   Take 2 mg by mouth See Admin Instructions                                lisinopril-hydrochlorothiazide 20-12.5 MG per tablet   Commonly known as:  PRINZIDE/ZESTORETIC   Take 2 tablets by mouth daily                                metFORMIN 500 MG tablet   Commonly known as:  GLUCOPHAGE   Take 1,000 mg by mouth 2 times daily (with meals) Pt takes it in the AM and the evening.                                NIZORAL 2 % shampoo   Apply topically daily as needed for itching or irritation (also 2x weeklly)   Generic drug:  ketoconazole                                oxyCODONE 5 MG IR tablet   Commonly known as:  ROXICODONE   Take 0.5 tablets (2.5 mg) by mouth every 4 hours as needed for moderate to severe pain                                 polyethylene glycol powder   Commonly known as:  MIRALAX/GLYCOLAX   Take 17 g by mouth daily                                senna-docusate 8.6-50 MG per tablet   Commonly known as:  SENOKOT-S;PERICOLACE   Take 1 tablet by mouth 2 times daily as needed for constipation   Last time this was given:  2 tablets on 9/1/2017  8:10 AM                                SIMVASTATIN PO   Take 40 mg by mouth At Bedtime                                Vitamin D-3 1000 UNITS Caps   Take 1,000 Units by mouth daily                                ZOFRAN PO   Take 4 mg by mouth every 6 hours as needed for nausea or vomiting

## 2017-08-30 NOTE — IP AVS SNAPSHOT
"    [address] SURGICAL: [phone]                                              INTERAGENCY TRANSFER FORM - LAB / IMAGING / EKG / EMG RESULTS   2017                    Hospital Admission Date: 2017  CELINA WELDON   : 1950  Sex: Male        Attending Provider: Arnaldo Farr MD     Allergies:  No Known Allergies    Infection:  None   Service:  HOSPITALIST    Ht:  1.803 m (5' 10.98\")   Wt:  85 kg (187 lb 6.3 oz)   Admission Wt:  76.7 kg (169 lb)    BMI:  26.15 kg/m 2   BSA:  2.06 m 2            Patient PCP Information     Provider PCP Type    E[facility]         Lab Results - 3 Days      Methicillin resistant staph aureus cult [[id number]]  Resulted: 17 1103, Result status: Final result    Ordering provider: Arnaldo Farr MD  17 0158 Resulting lab: [facility]    Specimen Information    Type Source Collected On   Nares  17 0025          Components       Value Reference Range Flag Lab   Specimen Description Nares      Culture Micro No MRSA isolated   [facility]            Glucose by meter [[id number]] (Abnormal)  Resulted: 17 0751, Result status: Final result    Ordering provider: Arnaldo Farr MD  17 0742 Resulting lab: POINT OF CARE TEST, GLUCOSE    Specimen Information    Type Source Collected On     17 0742          Components       Value Reference Range Flag Lab   Glucose 121 70 - 99 mg/dL H 170            Basic metabolic panel [[id number]] (Abnormal)  Resulted: 17 0616, Result status: Final result    Ordering provider: Arnaldo Farr MD  17 0000 Resulting lab: [facility]    Specimen Information    Type Source Collected On   Blood  17 0553          Components       Value Reference Range Flag Lab   Sodium 137 133 - 144 mmol/L  [facility]   Potassium 4.2 3.4 - 5.3 mmol/L  [facility]   Chloride 100 94 - 109 mmol/L  [facility]   Carbon Dioxide 28 " 20 - 32 mmol/L  [facility]   Anion Gap 9 3 - 14 mmol/L  [facility]   Glucose 117 70 - 99 mg/dL H [facility]   Urea Nitrogen 19 7 - 30 mg/dL  [facility]   Creatinine 0.76 0.66 - 1.25 mg/dL  [facility]   GFR Estimate >90 >60 mL/min/1.7m2  [facility]   Comment:  Non  GFR Calc   GFR Estimate If Black >90 >60 mL/min/1.7m2  [facility]   Comment:  African American GFR Calc   Calcium 8.6 8.5 - 10.1 mg/dL  [facility]            Glucose by meter [[id number]] (Abnormal)  Resulted: 08/31/17 2050, Result status: Final result    Ordering provider: Arnaldo Farr MD  08/31/17 2043 Resulting lab: POINT OF CARE TEST, GLUCOSE    Specimen Information    Type Source Collected On     08/31/17 2043          Components       Value Reference Range Flag Lab   Glucose 110 70 - 99 mg/dL H 170            Glucose by meter [[id number]] (Abnormal)  Resulted: 08/31/17 1700, Result status: Final result    Ordering provider: Arnaldo Farr MD  08/31/17 1653 Resulting lab: POINT OF CARE TEST, GLUCOSE    Specimen Information    Type Source Collected On     08/31/17 1653          Components       Value Reference Range Flag Lab   Glucose 156 70 - 99 mg/dL H 170            Glucose by meter [[id number]] (Abnormal)  Resulted: 08/31/17 1201, Result status: Final result    Ordering provider: Arnaldo Farr MD  08/31/17 1155 Resulting lab: POINT OF CARE TEST, GLUCOSE    Specimen Information    Type Source Collected On     08/31/17 1155          Components       Value Reference Range Flag Lab   Glucose 135 70 - 99 mg/dL H 170            Glucose by meter [[id number]]  Resulted: 08/31/17 0751, Result status: Final result    Ordering provider: Arnaldo Farr MD  08/31/17 0746 Resulting lab: POINT OF CARE TEST, GLUCOSE    Specimen Information    Type Source Collected On     08/31/17 0746          Components       Value Reference Range Flag Lab   Glucose 75 70 - 99 mg/dL  170            Basic metabolic panel [[id number]] (Abnormal)  Resulted:  08/31/17 0600, Result status: Final result    Ordering provider: Phil Chaudhari MD  08/31/17 0001 Resulting lab: [facility]    Specimen Information    Type Source Collected On   Blood  08/31/17 0531          Components       Value Reference Range Flag Lab   Sodium 139 133 - 144 mmol/L  [facility]   Potassium 4.0 3.4 - 5.3 mmol/L  [facility]   Chloride 101 94 - 109 mmol/L  [facility]   Carbon Dioxide 28 20 - 32 mmol/L  [facility]   Anion Gap 10 3 - 14 mmol/L  [facility]   Glucose 81 70 - 99 mg/dL  [facility]   Urea Nitrogen 46 7 - 30 mg/dL H [facility]   Creatinine 1.79 0.66 - 1.25 mg/dL H [facility]   GFR Estimate 38 >60 mL/min/1.7m2 L [facility]   Comment:  Non  GFR Calc   GFR Estimate If Black 46 >60 mL/min/1.7m2 L [facility]   Comment:  African American GFR Calc   Calcium 8.5 8.5 - 10.1 mg/dL  [facility]            Hemoglobin A1c [[id number]] (Abnormal)  Resulted: 08/31/17 0549, Result status: Final result    Ordering provider: Phil Chaudhari MD  08/31/17 0001 Resulting lab: [facility]    Specimen Information    Type Source Collected On   Blood  08/31/17 0531          Components       Value Reference Range Flag Lab   Hemoglobin A1C 6.8 4.3 - 6.0 % H [facility]            CBC with platelets [[id number]] (Abnormal)  Resulted: 08/31/17 0547, Result status: Final result    Ordering provider: Phil Chaudhari MD  08/31/17 0001 Resulting lab: [facility]    Specimen Information    Type Source Collected On   Blood  08/31/17 0531          Components       Value Reference Range Flag Lab   WBC 7.0 4.0 - 11.0 10e9/L  [facility]   RBC Count 4.29 4.4 - 5.9 10e12/L L [facility]   Hemoglobin 12.1 13.3 - 17.7 g/dL L [facility]   Hematocrit 35.1 40.0 - 53.0 % L [facility]   MCV 82 78 - 100 fl  [facility]   MCH 28.2 26.5 - 33.0 pg  [facility]   MCHC 34.5 31.5 - 36.5 g/dL  [facility]   RDW 13.7 10.0 - 15.0 %  [facility]   Platelet Count 220 150 - 450 10e9/L  [facility]            Glucose by  meter [[id number]] (Abnormal)  Resulted: 08/30/17 2231, Result status: Final result    Ordering provider: Arnaldo Farr MD  08/30/17 2225 Resulting lab: POINT OF CARE TEST, GLUCOSE    Specimen Information    Type Source Collected On     08/30/17 2225          Components       Value Reference Range Flag Lab   Glucose 130 70 - 99 mg/dL H 170            Testing Performed By     Lab - Abbreviation Name Director Address Valid Date Range    22 - [facility] Lab [facility] Unknown [address] [place] [address] 05/08/15 1057 - Present    170 - Unknown POINT OF CARE TEST, GLUCOSE Unknown Unknown 10/31/11 1114 - Present            Unresulted Labs     None      Encounter-Level Documents:     There are no encounter-level documents.      Order-Level Documents:     There are no order-level documents.

## 2017-08-30 NOTE — ED NOTES
Medication reconciliation not finished in triage or in ED. We have compared it to the MAR from [facility], but some meds are missing. We are asking that the Primary nurse please review the MAR that we have asked [facility] staff to send one more time.

## 2017-08-30 NOTE — MR AVS SNAPSHOT
"              After Visit Summary   2017    Don Redd    MRN: [id number]           Patient Information     Date Of Birth          1950        Visit Information        Provider Department      2017 1:00 PM Hyacinth Perez APRN CNP [facility]        Today's Diagnoses     Dehydration, moderate    -  1    Sedated due to medication        Spinal stenosis of cervical region        S/P cervical discectomy           Follow-ups after your visit        Who to contact     If you have questions or need follow up information about today's clinic visit or your schedule please contact [facility] directly at [phone].  Normal or non-critical lab and imaging results will be communicated to you by Axcelerhart, letter or phone within 4 business days after the clinic has received the results. If you do not hear from us within 7 days, please contact the clinic through Axcelerhart or phone. If you have a critical or abnormal lab result, we will notify you by phone as soon as possible.  Submit refill requests through Zify or call your pharmacy and they will forward the refill request to us. Please allow 3 business days for your refill to be completed.          Additional Information About Your Visit        MyChart Information     Zify lets you send messages to your doctor, view your test results, renew your prescriptions, schedule appointments and more. To sign up, go to [email or web address] . Click on \"Log in\" on the left side of the screen, which will take you to the Welcome page. Then click on \"Sign up Now\" on the right side of the page.     You will be asked to enter the access code listed below, as well as some personal information. Please follow the directions to create your username and password.     Your access code is: [email or web address]  Expires: 2017  3:00 PM     Your access code will  in 90 days. If you need help or a new code, please call your " [facility] or [phone].        Care EveryWhere ID     This is your Care EveryWhere ID. This could be used by other organizations to access your [place] medical records  [id number]        Your Vitals Were     Pulse Temperature Respirations Pulse Oximetry BMI (Body Mass Index)       71 97.9  F (36.6  C) 18 90% 25.24 kg/m2        Blood Pressure from Last 3 Encounters:   08/30/17 141/76   08/29/17 126/86   08/25/17 (!) 154/94    Weight from Last 3 Encounters:   08/30/17 181 lb (82.1 kg)   08/29/17 181 lb (82.1 kg)   08/25/17 189 lb 6.4 oz (85.9 kg)              Today, you had the following     No orders found for display       Primary Care Provider Office Phone # Fax #    [facility] [phone]       [address]  [facility] [address]        Equal Access to Services     JOSHUA GUTHRIE : Hadii aad ku hadasho Soomaali, waaxda luqadaha, qaybta kaalmada adeegyada, waxay phoenixin hayharleyn ruddy haley . So [facility] [phone].    ATENCIÓN: Si habla español, tiene a blanco disposición servicios gratuitos de asistencia lingüística. Llame al [phone].    We comply with applicable federal civil rights laws and [place] laws. We do not discriminate on the basis of race, color, national origin, age, disability sex, sexual orientation or gender identity.            Thank you!     Thank you for choosing [facility]  for your care. Our goal is always to provide you with excellent care. Hearing back from our patients is one way we can continue to improve our services. Please take a few minutes to complete the written survey that you may receive in the mail after your visit with us. Thank you!             Your Updated Medication List - Protect others around you: Learn how to safely use, store and throw away your medicines at www.disposemymeds.org.          This list is accurate as of: 8/30/17  3:00 PM.  Always use your most recent med list.                   Brand Name  Dispense Instructions for use Diagnosis    ASPIRIN PO      Take 81 mg by mouth daily        DEXAMETHASONE PO      Take 2 mg by mouth See Admin Instructions        DIAZEPAM PO      Take 5 mg by mouth every 6 hours as needed for anxiety        FAMOTIDINE PO      Take 20 mg by mouth 2 times daily        GABAPENTIN PO      Take 600 mg by mouth 2 times daily        HYDROXYZINE PAMOATE PO      Take 25 mg by mouth At Bedtime        lisinopril-hydrochlorothiazide 20-12.5 MG per tablet    PRINZIDE/ZESTORETIC    30 tablet    Take 2 tablets by mouth daily        metFORMIN 500 MG tablet    GLUCOPHAGE     Take 1,000 mg by mouth 2 times daily (with meals) Pt takes it in the AM and the evening.        METHOCARBAMOL PO      Take 750 mg by mouth 4 times daily        NIZORAL 2 % shampoo   Generic drug:  ketoconazole      Apply topically daily as needed for itching or irritation (also 2x weeklly)        OXYCODONE HCL PO      Take 5-15 mg by mouth every 4 hours as needed        polyethylene glycol powder    MIRALAX/GLYCOLAX     Take 17 g by mouth daily        senna-docusate 8.6-50 MG per tablet    SENOKOT-S;PERICOLACE     Take 1-2 tablets by mouth 2 times daily        SIMVASTATIN PO      Take 40 mg by mouth At Bedtime        Vitamin D-3 1000 UNITS Caps      Take 1,000 Units by mouth daily

## 2017-08-30 NOTE — IP AVS SNAPSHOT
` ` Patient Information     Patient Name Sex     Don Redd ([id number]) Male 1950       Room Bed    267 267-01      Patient Demographics     Address Phone    [id number] [facility] [id number] [phone] (Home)  [phone] (Mobile)      Patient Ethnicity & Race     Ethnic Group Patient Race    American White      Emergency Contact(s)     Name Relation Home Work Mobile    Stella Redd Significant other [phone]    NO 2ND, PER PT          Documents on File        Status Date Received Description       Documents for the Patient    Affiliate Privacy placeholder   phase3    Consent for Services - Hospital/Clinic Received () 05/22/15     Consent for EHR Access Received 05/22/15     Privacy Notice - [place] Received 05/22/15     Insurance Card Received 17 medicare    External Medication Information Consent       Patient ID       [facility] Specified Other       Consent for Services/Privacy Notice - Hospital/Clinic Received 17     HIM SERGIO Authorization  17     Insurance Card Received 17 MA    Insurance Card Received 17 humana    Physical Therapy Certification Received 17     HIM SERGIO Authorization  17        Documents for the Encounter    CMS IM for Patient Signature Received 17       Admission Information     Attending Provider Admitting Provider Admission Type Admission Date/Time    Arnaldo Farr MD Jones, Daniel William, MD Emergency 17  1504    Discharge Date Hospital Service Auth/Cert Status Service Area     [facility] SERVICES    Unit Room/Bed Admission Status       PH 2A MEDICAL SURGICAL 267/267-01 Admission (Confirmed)       Admission     Complaint    MILA (acute kidney injury) (H)      Hospital Account     Name Acct ID Class Status Primary Coverage    Don Redd [id number] Inpatient Open UCARE - [employer] [facility] [facility] [employer]            Guarantor Account (for Hospital  Account #[id number])     Name Relation to Pt Service Area Active? Acct Type    Don Redd Self FCS Yes Personal/Family    Address Phone          [address] [place] [address] [phone](H)              Coverage Information (for Hospital Account #[id number])     F/O Payor/Plan Precert #    UCARE/UCARE FOR S[employer]IORS [facility] [facility] [employer]     Subscriber Subscriber #    Don Redd [id number]    Address Phone    [address]  [place] [address] [phone]

## 2017-08-30 NOTE — IP AVS SNAPSHOT
"          [address] MEDICAL SURGICAL: [phone]                                              INTERAGENCY TRANSFER FORM - PHYSICIAN ORDERS   2017                    Hospital Admission Date: 2017  CELINA WELDON   : 1950  Sex: Male        Attending Provider: Arnaldo Farr MD     Allergies:  No Known Allergies    Infection:  None   Service:  HOSPITALIST    Ht:  1.803 m (5' 10.98\")   Wt:  85 kg (187 lb 6.3 oz)   Admission Wt:  76.7 kg (169 lb)    BMI:  26.15 kg/m 2   BSA:  2.06 m 2            Patient PCP Information     Provider PCP Type    Evelio Luo [facility]      ED Clinical Impression     Diagnosis Description Comment Added By Time Added    MILA (acute kidney injury) (H) [N17.9] MILA (acute kidney injury) (H) [N17.9]  Reji Santamaria MD 2017  4:30 PM    Dehydration [E86.0] Dehydration [E86.0]  Reji Santamaria MD 2017  4:30 PM      Hospital Problems as of 2017              Priority Class Noted POA    Diabetes mellitus, type 2 (H) Medium  2016 Yes    Essential hypertension, benign Medium  2016 Yes    Stenosis of cervical spine with myelopathy Medium  2017 Yes    * (Principal)MILA (acute kidney injury) (H) Medium  2017 Yes    Encephalopathy Medium  2017 Yes      Non-Hospital Problems as of 2017              Priority Class Noted    Obesity, unspecified Medium  2006    Backache Medium  2009    Colonic polyp Medium  3/4/2010    Stenosis, cervical spine Medium  6/10/2011    HTN (hypertension) Medium  2013    Dyslipidemia Medium  2016    Syncope, unspecified syncope type Medium  2016    Elevated lactic acid level Medium  2016    Hyperlipidemia LDL goal <100 Medium  2016    Balance problem Medium  2017    Diabetic neuropathy associated with type 2 diabetes mellitus (H) Medium  2017    Ehrlichiosis Medium  2017    Fall at home Medium  2017    Lumbar degenerative disc disease " Medium  8/2/2017    Spinal stenosis of cervical region Medium  8/2/2017    Hypertension Medium  8/7/2017      Code Status History     Date Active Date Inactive Code Status Order ID Comments User Context    12/25/2016  9:27 AM 8/30/2017  8:53 PM Full Code [id number]  Lazaro Lopez MD Outpatient    12/24/2016  4:53 AM 12/25/2016  9:27 AM Full Code [id number]  Phil Chaudhari MD Inpatient         Medication Review      START taking        Dose / Directions Comments    acetaminophen 325 MG tablet   Commonly known as:  TYLENOL   Used for:  Spinal stenosis of cervical region        Dose:  650 mg   Take 2 tablets (650 mg) by mouth every 4 hours as needed for mild pain   Quantity:  100 tablet   Refills:  0          CONTINUE these medications which may have CHANGED, or have new prescriptions. If we are uncertain of the size of tablets/capsules you have at home, strength may be listed as something that might have changed.        Dose / Directions Comments    oxyCODONE 5 MG IR tablet   Commonly known as:  ROXICODONE   This may have changed:    - medication strength  - how much to take  - reasons to take this   Used for:  Spinal stenosis of cervical region        Dose:  2.5 mg   Take 0.5 tablets (2.5 mg) by mouth every 4 hours as needed for moderate to severe pain   Quantity:  20 tablet   Refills:  0        senna-docusate 8.6-50 MG per tablet   Commonly known as:  SENOKOT-S;PERICOLACE   This may have changed:    - how much to take  - when to take this  - reasons to take this   Used for:  Constipation, unspecified constipation type        Dose:  1 tablet   Take 1 tablet by mouth 2 times daily as needed for constipation   Quantity:  100 tablet   Refills:  0          CONTINUE these medications which have NOT CHANGED        Dose / Directions Comments    ASPIRIN PO        Dose:  81 mg   Take 81 mg by mouth daily   Refills:  0        DEXAMETHASONE PO        Dose:  2 mg   Take 2 mg by mouth See Admin Instructions   Refills:  0         lisinopril-hydrochlorothiazide 20-12.5 MG per tablet   Commonly known as:  PRINZIDE/ZESTORETIC        Dose:  2 tablet   Take 2 tablets by mouth daily   Quantity:  30 tablet   Refills:  0        metFORMIN 500 MG tablet   Commonly known as:  GLUCOPHAGE        Dose:  1000 mg   Take 1,000 mg by mouth 2 times daily (with meals) Pt takes it in the AM and the evening.   Refills:  0        NIZORAL 2 % shampoo   Generic drug:  ketoconazole        Apply topically daily as needed for itching or irritation (also 2x weeklly)   Refills:  0        polyethylene glycol powder   Commonly known as:  MIRALAX/GLYCOLAX        Dose:  17 g   Take 17 g by mouth daily   Refills:  0        SIMVASTATIN PO        Dose:  40 mg   Take 40 mg by mouth At Bedtime   Refills:  0        Vitamin D-3 1000 UNITS Caps        Dose:  1000 Units   Take 1,000 Units by mouth daily   Refills:  0        ZOFRAN PO        Dose:  4 mg   Take 4 mg by mouth every 6 hours as needed for nausea or vomiting   Refills:  0          STOP taking     DIAZEPAM PO           FAMOTIDINE PO           GABAPENTIN PO           HYDROXYZINE PAMOATE PO           METHOCARBAMOL PO                   Summary of Visit     Reason for your hospital stay       Acute kidney injury caused by dehydration and sedation, most likely because of the medications given for pain after the surgery causing sleepiness and confusion.  These medications have been stopped and your pain has been well controlled with tylenol alone.  Your kidney function has returned to normal with IV fluids given during your stay.  Please continue to drink a lot of fluids for the next week to make sure your kidneys continue to be well hydrated as they recover.             After Care     Activity - Up with nursing assistance           Advance Diet as Tolerated       Follow this diet upon discharge: Moderate consistent carbohydrate (4571-6225 nata / 4-6 CHO units per meal)       General info for SNF       Length of Stay  Estimate: Short Term Care: Estimated # of Days <30  Condition at Discharge: Improving  Level of care:skilled   Rehabilitation Potential: Good  Admission H&P remains valid and up-to-date: Yes  Recent Chemotherapy: N/A  Use Nursing Home Standing Orders: Yes       Glucose monitor nursing POCT       BID - before breakfast and before supper       Mantoux instructions       Give two-step Mantoux (PPD) Per Facility Policy Yes             Referrals     Occupational Therapy Adult Consult       Evaluate and treat as clinically indicated.    Reason:  S/p cervical spine surgery       Physical Therapy Adult Consult       Evaluate and treat as clinically indicated.    Reason:  S/p Cervical spine surgery             Follow-Up Appointment Instructions     Future Labs/Procedures    Follow Up and recommended labs and tests     Comments:    Follow up with Nursing home physician as needed  Follow up with surgeon as scheduled      Follow-Up Appointment Instructions     Follow Up and recommended labs and tests       Follow up with Nursing home physician as needed  Follow up with surgeon as scheduled             Statement of Approval     Ordered          09/01/17 1059  I have reviewed and agree with all the recommendations and orders detailed in this document.  EFFECTIVE NOW     Approved and electronically signed by:  Cassi Miranda MD

## 2017-08-30 NOTE — ED PROVIDER NOTES
History     Chief Complaint   Patient presents with     Abnormal Labs     The history is provided by the patient, the spouse and the nursing home.     Don Redd is a 66 year old male who presents to the emergency department with concerns of abnormal labs. He was being seen at [facility] today for an episodic care visit and they were concerned about his abnormal lab results. They are concerned that he is moderately dehydrated, has kidney function abnormalities and is sedated due to medications. He had neck surgery on August 18th at [facility] with anterior and posterior incision sites. He has present ecchymosis from anterior incision site down to just above bilateral nipple line and no signs of infection at either posterior or anterior incision sites. He states that he has been experiencing a hoarse voice since the surgery. He says that he has decreased urination. His wife endorses that he is on a high dose of decadron for the swelling and it is messing with his blood glucose levels, as they have been in the 400's for the past couple of days. He denies having any recent fevers. He does state that he had a halo applied for surgery.     1. Dehydration, moderate    2. Sedated due to medication    3. Spinal stenosis of cervical region    4. S/P cervical discectomy    - came to see Tyrell today after having vomiting and sedation yesterday.  Most likely the oxycodone medication caused him over sedation in accumalation.  Had lowered his oxycodone from 15mg 5x day scheduled to 10mg QID but still holding medication based on his alertness.  Yesterday labs were taken and encouraged fluids since by the afternoon he was more alert and drinking after Zofran given.    This morning, staff gave him 10mg of oxycodone at 9am.  Still was saying pain at a 9/10  At lunch time he came out to eat lunch but stopped him by the desk to speak with him.  Pain more under control at around a 4/10.  Still does not feel well.  No vomiting  today.  Able to answer questions.  Labs drawn after lunch time - BMP and CBC.  Labs came back showing worsening kidney function.  Gave him option of going into ED or stay here for IV therapy.    I have reviewed the Medications, Allergies, Past Medical and Surgical History, and Social History in the Epic system.    Patient Active Problem List   Diagnosis     Syncope, unspecified syncope type     Elevated lactic acid level     Diabetes mellitus, type 2 (H)     Essential hypertension, benign     Hyperlipidemia LDL goal <100     Backache     Balance problem     Colonic polyp     Diabetic neuropathy associated with type 2 diabetes mellitus (H)     Dyslipidemia     Ehrlichiosis     Fall at home     HTN (hypertension)     Hypertension     Lumbar degenerative disc disease     Obesity, unspecified     Spinal stenosis of cervical region     Stenosis of cervical spine with myelopathy     Stenosis, cervical spine     Past Medical History:   Diagnosis Date     Diabetes type 2, controlled (H)      Hypertension      Ureteral calculi 4/2016     Past Surgical History:   Procedure Laterality Date     AS LUMBAR SPINE FUSN,POST INTRBDY  2011     CYSTOSCOPY       FUSION CERVICAL ANTERIOR TWO LEVELS  2011    C5-7     Family History   Problem Relation Age of Onset     Coronary Artery Disease Mother      Breast Cancer Mother      Hypertension Mother      Other Cancer Father      lung cancer     Social History   Substance Use Topics     Smoking status: Former Smoker     Smokeless tobacco: Not on file     Alcohol use Yes        There is no immunization history on file for this patient.     No Known Allergies    Current Outpatient Prescriptions   Medication Sig Dispense Refill     Ondansetron HCl (ZOFRAN PO) Take 4 mg by mouth every 6 hours as needed for nausea or vomiting       OXYCODONE HCL PO Take 10 mg by mouth every 4 hours as needed        senna-docusate (SENOKOT-S;PERICOLACE) 8.6-50 MG per tablet Take 2 tablets by mouth 2 times daily    "     ASPIRIN PO Take 81 mg by mouth daily       SIMVASTATIN PO Take 40 mg by mouth At Bedtime       Cholecalciferol (VITAMIN D-3) 1000 UNITS CAPS Take 1,000 Units by mouth daily       DIAZEPAM PO Take 5 mg by mouth every 6 hours as needed for anxiety       FAMOTIDINE PO Take 20 mg by mouth 2 times daily       HYDROXYZINE PAMOATE PO Take 25 mg by mouth At Bedtime       METHOCARBAMOL PO Take 750 mg by mouth 4 times daily       polyethylene glycol (MIRALAX/GLYCOLAX) powder Take 17 g by mouth daily       DEXAMETHASONE PO Take 2 mg by mouth See Admin Instructions        GABAPENTIN PO Take 600 mg by mouth 2 times daily        ketoconazole (NIZORAL) 2 % shampoo Apply topically daily as needed for itching or irritation (also 2x weeklly)       lisinopril-hydrochlorothiazide (PRINZIDE/ZESTORETIC) 20-12.5 MG per tablet Take 2 tablets by mouth daily  30 tablet      metFORMIN (GLUCOPHAGE) 500 MG tablet Take 1,000 mg by mouth 2 times daily (with meals) Pt takes it in the AM and the evening.       Review of Systems   Constitutional: Negative for fever.   HENT: Positive for trouble swallowing.    Genitourinary: Positive for decreased urine volume.   Skin: Positive for color change (ecchymosis) and wound (surgical incision sites).   All other systems reviewed and are negative.    Physical Exam   BP: (!) 87/66  Pulse: 76  Temp: 97.4  F (36.3  C)  Resp: 12  Height: 180.3 cm (5' 11\")  Weight: 76.7 kg (169 lb)  SpO2: 93 %  Physical Exam   Constitutional: He is oriented to person, place, and time. He appears well-developed and well-nourished. He has a sickly appearance.   HENT:   Head: Atraumatic.   Oropharynx dry   Eyes: EOM are normal.   Ecchymosis around left eye.    Neck:       Both incision sites are clean and dry with no signs of infection.   Cardiovascular: Normal rate and regular rhythm.    Murmur heard.   Systolic murmur is present with a grade of 4/6   Pulmonary/Chest: Effort normal and breath sounds normal.   Abdominal: Soft. " Bowel sounds are normal.   Musculoskeletal: Normal range of motion.   Strength intact to upper extremities.    Neurological: He is alert and oriented to person, place, and time.   Skin: Skin is warm and dry. Ecchymosis (from anterior incision site to just above bilateral nipple line) noted. There is pallor.   Psychiatric: He has a normal mood and affect. His behavior is normal. His speech is delayed.   Nursing note and vitals reviewed.    ED Course     ED Course     Procedures             Critical Care time:  none          Results for orders placed or performed in visit on 08/30/17 (from the past 24 hour(s))   Basic metabolic panel   Result Value Ref Range    Sodium 133 133 - 144 mmol/L    Potassium 4.2 3.4 - 5.3 mmol/L    Chloride 90 (L) 94 - 109 mmol/L    Carbon Dioxide 28 20 - 32 mmol/L    Anion Gap 15 (H) 3 - 14 mmol/L    Glucose 151 (H) 70 - 99 mg/dL    Urea Nitrogen 64 (H) 7 - 30 mg/dL    Creatinine 3.26 (H) 0.66 - 1.25 mg/dL    GFR Estimate 19 (L) >60 mL/min/1.7m2    GFR Estimate If Black 23 (L) >60 mL/min/1.7m2    Calcium 9.6 8.5 - 10.1 mg/dL   CBC with platelets differential   Result Value Ref Range    WBC 10.7 4.0 - 11.0 10e9/L    RBC Count 4.99 4.4 - 5.9 10e12/L    Hemoglobin 13.8 13.3 - 17.7 g/dL    Hematocrit 40.6 40.0 - 53.0 %    MCV 81 78 - 100 fl    MCH 27.7 26.5 - 33.0 pg    MCHC 34.0 31.5 - 36.5 g/dL    RDW 14.1 10.0 - 15.0 %    Platelet Count 312 150 - 450 10e9/L    Diff Method Automated Method     % Neutrophils 75.1 %    % Lymphocytes 13.8 %    % Monocytes 9.1 %    % Eosinophils 1.2 %    % Basophils 0.1 %    % Immature Granulocytes 0.7 %    Absolute Neutrophil 8.1 1.6 - 8.3 10e9/L    Absolute Lymphocytes 1.5 0.8 - 5.3 10e9/L    Absolute Monocytes 1.0 0.0 - 1.3 10e9/L    Absolute Eosinophils 0.1 0.0 - 0.7 10e9/L    Absolute Basophils 0.0 0.0 - 0.2 10e9/L    Abs Immature Granulocytes 0.1 0 - 0.4 10e9/L     Medications   0.9% sodium chloride infusion ( Intravenous New Bag 8/30/17 0512)   0.9% sodium  chloride BOLUS (0 mLs Intravenous Stopped 8/30/17 1650)     Assessments & Plan (with Medical Decision Making)  66-year-old male who is 12 days postoperative from cervical spine surgery.  He has been at the local nursing home and I received a call today as there was concerns for acute kidney injury and dehydration.  Exam here and labs are most consistent with this.  No signs of infection otherwise.  Specifically, normal white count and no fever.  Incisions look clean and dry.  Given 2 L of IV fluid with considerable improvement in blood pressures.  Will be admitted to the hospitalist service as discussed with Dr. Farr.       I have reviewed the nursing notes.    I have reviewed the findings, diagnosis, plan and need for follow up with the patient.       New Prescriptions    No medications on file       Final diagnoses:   MILA (acute kidney injury) (H)   Dehydration     This document serves as a record of services personally performed by Reji Santamaria MD. It was created on their behalf by Nicolle Arguelles, a trained medical scribe. The creation of this record is based on the provider's personal observations and the statements of the patient. This document has been checked and approved by the attending provider.    Note: Chart documentation done in part with Dragon Voice Recognition software. Although reviewed after completion, some word and grammatical errors may remain.    8/30/2017   [facility] EMERGENCY DEPARTMENT     Reji Santamaria MD  08/30/17 1700

## 2017-08-30 NOTE — IP AVS SNAPSHOT
"` `           [address] MEDICAL SURGICAL: [phone]                                              INTERAGENCY TRANSFER FORM - NURSING   2017                    Hospital Admission Date: 2017  CELINA WELDON   : 1950  Sex: Male        Attending Provider: Arnaldo Farr MD     Allergies:  No Known Allergies    Infection:  None   Service:  HOSPITALIST    Ht:  1.803 m (5' 10.98\")   Wt:  85 kg (187 lb 6.3 oz)   Admission Wt:  76.7 kg (169 lb)    BMI:  26.15 kg/m 2   BSA:  2.06 m 2            Patient PCP Information     Provider PCP Type    [facility]      Current Code Status     Date Active Code Status Order ID Comments User Context       2017  8:53 PM Full Code [id number]  Phil Chaudhari MD Inpatient       Code Status History     Date Active Date Inactive Code Status Order ID Comments User Context    2016  9:27 AM 2017  8:53 PM Full Code [id number]  Lzaaro Lopez MD Outpatient    2016  4:53 AM 2016  9:27 AM Full Code [id number]  Phil Chaudhari MD Inpatient      Advance Directives        Does patient have a scanned Advance Directive/ACP document in EPIC?           Yes (See prior)        Hospital Problems as of 2017              Priority Class Noted POA    Diabetes mellitus, type 2 (H) Medium  2016 Yes    Essential hypertension, benign Medium  2016 Yes    Stenosis of cervical spine with myelopathy Medium  2017 Yes    * (Principal)MILA (acute kidney injury) (H) Medium  2017 Yes    Encephalopathy Medium  2017 Yes      Non-Hospital Problems as of 2017              Priority Class Noted    Obesity, unspecified Medium  2006    Backache Medium  2009    Colonic polyp Medium  3/4/2010    Stenosis, cervical spine Medium  6/10/2011    HTN (hypertension) Medium  2013    Dyslipidemia Medium  2016    Syncope, unspecified syncope type Medium  2016    Elevated lactic acid level Medium "  12/24/2016    Hyperlipidemia LDL goal <100 Medium  12/24/2016    Balance problem Medium  1/14/2017    Diabetic neuropathy associated with type 2 diabetes mellitus (H) Medium  6/7/2017    Ehrlichiosis Medium  6/20/2017    Fall at home Medium  8/2/2017    Lumbar degenerative disc disease Medium  8/2/2017    Spinal stenosis of cervical region Medium  8/2/2017    Hypertension Medium  8/7/2017      Immunizations     None         END      ASSESSMENT     Discharge Profile Flowsheet     EXPECTED DISCHARGE     GASTROINTESTINAL (ADULT,PEDIATRIC,OB)      Expected Discharge Date  09/01/17 08/31/17 1603   GI WDL  WDL 09/01/17 0821    DISCHARGE NEEDS ASSESSMENT     All Quadrants Bowel Sounds  audible and normoactive 09/01/17 0821    Patient/family verbalizes understanding of discharge plan recommendations?  Yes (return to [facility]) 09/01/17 1107   Last Bowel Movement  09/01/17 09/01/17 0442    Medical Team notified of plan?  yes 09/01/17 1107   Passing flatus  yes 09/01/17 0821    Transportation Available  van, wheelchair accessible 08/31/17 1603   COMMUNICATION ASSESSMENT      FUNCTIONAL LEVEL CURRENT     Patient's communication style  spoken language (English or Bilingual) 05/23/17 1259    Ambulation  2-->assistive person 09/01/17 1107   SKIN      Transferring  0-->independent 09/01/17 1107   Inspection of bony prominences  Full 09/01/17 0821    Toileting  2-->assistive person 09/01/17 1107   Skin WDL  ex 09/01/17 0442    Dressing  2-->assistive person 09/01/17 1107   Skin Temperature  cool 09/01/17 0442    Eating  0-->independent 09/01/17 1107   Skin Integrity  -- (bruises on left eye and neck) 09/01/17 0821    Communication  0-->understands/communicates without difficulty 09/01/17 1107   Additional Documentation  Incision (LDA) 08/30/17 2128    Swallowing  0-->swallows foods/liquids without difficulty 09/01/17 1107   SAFETY      COGNITIVE/PERCEPTUAL/DEVELOPMENTAL     Safety WDL  WDL 09/01/17 0821    Current Mental  "Status/Cognitive Functioning  other (see comments) (sometimes forgetful) 09/01/17 1107   Safety Factors  upper side rails raised x 2;ID band on;wheels locked;call light in reach 09/01/17 0442                 Assessment WDL (Within Defined Limits) Definitions           Safety WDL     Effective: 09/28/15    Row Information: <b>WDL Definition:</b> Bed in low position, wheels locked; call light in reach; upper side rails up x 2; ID band on<br> <font color=\"gray\"><i>Item=AS safety wdl>>List=AS safety wdl>>Version=F14</i></font>      Skin WDL     Effective: 09/28/15    Row Information: <b>WDL Definition:</b> Warm; dry; intact; elastic; without discoloration; pressure points without redness<br> <font color=\"gray\"><i>Item=AS skin wdl>>List=AS skin wdl>>Version=F14</i></font>      Vitals     Vital Signs Flowsheet     VITAL SIGNS     Change in Pain  getting worse 09/01/17 0338    Temp  96.4  F (35.8  C) 09/01/17 0741   Pain Control  partially effective 09/01/17 0338    Temp src  Oral 09/01/17 0741   Functioning  pain keeps me from doing most of what I need to do 09/01/17 0338    Resp  16 09/01/17 0741   Sleep  awake with pain most of the night 09/01/17 0338    Pulse  72 09/01/17 0741   HEIGHT AND WEIGHT      Heart Rate  72 09/01/17 0741   Height  1.803 m (5' 10.98\") 08/31/17 0205    Pulse/Heart Rate Source  Monitor 09/01/17 0741   Height Method  Stated 08/31/17 0205    BP  177/83 09/01/17 0741   Height Method  Stated 08/30/17 1512    BP Location  Right arm 09/01/17 0741   Weight  85 kg (187 lb 6.3 oz) 08/31/17 0205    OXYGEN THERAPY     Weight Method  Bed scale 08/31/17 0205    SpO2  99 % 09/01/17 0741   BSA (Calculated - sq m)  2.06 08/31/17 0205    O2 Device  None (Room air) 09/01/17 0741   BMI (Calculated)  26.2 08/31/17 0205    PAIN/COMFORT     RODNEY COMA SCALE      Patient Currently in Pain  yes 09/01/17 0811   Best Eye Response  4-->(E4) spontaneous 08/31/17 0948    Preferred Pain Scale  CAPA (Clinically Aligned Pain " Assessment) ([facility] Adults Only) 09/01/17 0338   Best Motor Response  6-->(M6) obeys commands 08/31/17 0948    Patient's Stated Pain Goal  1 08/30/17 1512   Best Verbal Response  5-->(V5) oriented 08/31/17 0948    0-10 Pain Scale  8 (Pain increasing now that he is awake and moving. ) 08/30/17 1915   [place] Coma Scale Score  15 08/31/17 0948    Pain Location  Neck 09/01/17 0811   POSITIONING      Pain Orientation  Anterior;Posterior 08/31/17 0922   Body Position  independently positioning 09/01/17 0821    Pain Descriptors  Aching 09/01/17 0338   Head of Bed (HOB)  HOB at 15 degrees 09/01/17 0442    Pain Management Interventions  analgesia administered 09/01/17 0338   DAILY CARE      Pain Intervention(s)  Medication (See eMAR) 09/01/17 0811   Activity Type  activity adjusted per tolerance 09/01/17 0821    CLINICALLY ALIGNED PAIN ASSESSMENT (CAPA) ([facility] ADULTS ONLY)     Activity Level of Assistance  assistance, 1 person 09/01/17 0821    Comfort  tolerable with discomfort 09/01/17 0338                 Patient Lines/Drains/Airways Status    Active LINES/DRAINS/AIRWAYS     Name: Placement date: Placement time: Site: Days: Last dressing change:    Peripheral IV 08/30/17 Left Upper forearm 08/30/17   1527   Upper forearm   1     Wound 05/23/17 Left Leg tick bite to left lower leg with redness and itching to site 05/23/17   1310   Leg   100     Incision/Surgical Site 08/30/17 Anterior;Posterior Neck 08/30/17   1845    1             Patient Lines/Drains/Airways Status    Active PICC/CVC     None            Intake/Output Detail Report     Date Intake     Output Net    Shift P.O. I.V. IV Piggyback Total Urine Total       Noc 08/30/17 2300 - 08/31/17 0659 -- 981 -- 981 1650 1650 -669    Day 08/31/17 0700 - 08/31/17 1459 -- -- -- -- 2125 2125 -2125    Maria Luisa 08/31/17 1500 - 08/31/17 2259 -- -- -- -- 2075 2075 -2075    Noc 08/31/17 2300 - 09/01/17 0659 -- -- -- -- [phone] -6724    Day  09/01/17 0700 - 09/01/17 1459 -- -- -- -- 1250 1250 -1250      Last Void/BM       Most Recent Value    Urine Occurrence     Stool Occurrence 1 at 09/01/2017 0907      Case Management/Discharge Planning     Case Management/Discharge Planning Flowsheet     REFERRAL INFORMATION     Who is your support system?  Significant Other 08/31/17 1603    Did the Initial Social Work Assessment result in a Social Work Case?  Yes 08/31/17 1556   Significant Other's Name  Stella  08/31/17 1603    Admission Type  inpatient 08/31/17 1556   Description of Support System  Supportive;Involved 08/31/17 1603    Arrived From  skilled nursing facility 08/31/17 1556   Support Assessment  Adequate family and caregiver support 08/31/17 1603    Referral Source  physician 08/31/17 1556   Quality of Family Relationships  supportive;involved;evident 08/31/17 1603    Reason For Consult  discharge planning 08/31/17 1556   EMPLOYMENT       Assigned to Anjel Burton  08/31/17 1603   Do you work full or part-time?  no 08/31/17 1603    Primary Care MD Name  Eveliotwila Packhelderhoma 08/31/17 1603   COPING/STRESS      LIVING ENVIRONMENT     Major Change/Loss/Stressor  hospitalization;surgery/procedure 08/30/17 2052    Lives With  facility resident 08/31/17 1603   EXPECTED DISCHARGE      Living Arrangements  extended care facility 08/31/17 1603   Expected Discharge Date  09/01/17 08/31/17 1603    Provides Primary Care For  no one, unable/limited ability to care for self 08/31/17 1603   DISCHARGE PLANNING      Primary Care Provided By  other (see comments) (nursing home staff ) 08/31/17 1603   Patient/family verbalizes understanding of discharge plan recommendations?  Yes (return to [facility]) 09/01/17 1107    Quality Of Family Relationships  supportive;involved 08/31/17 1603   Medical Team notified of plan?  yes 09/01/17 1107    Able to Return to Prior Living Arrangements  yes 08/31/17 1603   Transportation Available  van, wheelchair accessible 08/31/17 1603     Living Arrangement Comments  pt plans to return to [facility]  08/31/17 1603   ABUSE RISK SCREEN      HOME SAFETY     QUESTION TO PATIENT:  Has a member of your family or a partner(now or in the past) intimidated, hurt, manipulated, or controlled you in any way?  no 08/30/17 1513    Patient Feels Safe Living in Home?  yes 08/31/17 1603   QUESTION TO PATIENT: Do you feel safe going back to the place where you are living?  yes 08/30/17 1513    ASSESSMENT OF FAMILY/SOCIAL SUPPORT     (R) MENTAL HEALTH SUICIDE RISK      Marital Status   08/31/17 1603   Are you depressed or being treated for depression?  No 08/30/17 2039

## 2017-08-30 NOTE — IP AVS SNAPSHOT
` `           [address] SURGICAL: [phone]                 INTERAGENCY TRANSFER FORM - NOTES (H&P, Discharge Summary, Consults, Procedures, Therapies)   2017                    Hospital Admission Date: 2017  CELINA WELDON   : 1950  Sex: Male        Patient PCP Information     Provider PCP Type    Evelio Luo [facility]         History & Physicals      H&P by Phil Chaudhari MD at 2017  8:45 PM     Author:  Phil Chaudhari MD Service:  Hospitalist Author Type:  Physician    Filed:  2017  9:21 PM Date of Service:  2017  8:45 PM Creation Time:  2017  8:53 PM    Status:  Signed :  Phil Chaudhari MD (Physician)         [facility]    History and Physical  Hospitalist       Date of Admission:  2017  Date of Service (when I saw the patient): 17    Assessment & Plan   Celina Weldon is a 66 year old male who presents with increased sedation and laboratory values showing increased dehydration over the past several days. He is rehabbing at a local nursing home after having cervical neck fusion at [facility] on 2017. In the nursing home over the past day he's had increased sedation which is thought secondary to his medications, taking narcotics, gabapentin for postoperative pain. Yesterday his creatinine was 1.78, elevated over normal values and today his creatinine is 3.26 with a BUN of 64. Patient is being admitted to hospital for treatment of acute kidney injury which is likely secondary to dehydration from poor oral intake. On clinical exam he seems somewhat confused and sedated which may be secondary to the dehydration, but could be secondary to his medications causing a toxic effect. Will stop all these medications at this point, getting low dose oxycodone if necessary for pain and we'll see if his mental status clears over time. At this time there is no signs of any  infectious etiology, but will monitor for this.[WG1.1]     Principal Problem:    MILA (acute kidney injury) (H)    Assessment:[WG1.2] likely due to dehydration secondary to poor oral intake secondary to sedation from medications.[WG1.1]    Plan:[WG1.2] IV fluid both been given period, continue IV fluids overnight. Recheck labs in the morning.[WG1.1]  Active Problems:    Encephalopathy    Assessment:[WG1.2] most likely due to dehydration although could be medication related activities on a number of sedating medications after surgery.[WG1.1]    Plan:[WG1.2] IV fluids, old sedating medications, follow[WG1.1]    Diabetes mellitus, type 2 (H)    Assessment:[WG1.2] early taking metformin, numbers up somewhat due to steroid use[WG1.1]    Plan:[WG1.2] hold metformin due to kidney status. Order  QID glucose checks with sliding scale coverage[WG1.1]    Essential hypertension, benign    Assessment:[WG1.2] low in the ED due to dehydration. Better numbers now after hydration[WG1.1]    Plan:[WG1.2] hold his blood pressure medicines for now[WG1.1]    Stenosis of cervical spine with myelopathy    Assessment:[WG1.2] status post cervical neck fusion and surgery on August 18. Appears have a lot of postoperative bleeding in the anterior chest and area of the neck.[WG1.1]    Plan:[WG1.2] for now follow, outpatient follow-up with neurosurgery  DVT Prophylaxis: Pneumatic Compression Devices  Code Status: Full Code    Disposition: Expected discharge in 2 days once feeling better, renal status improved.    Phil Chaudhari MD    Primary Care Physician   Evelio Luo    Chief Complaint   56-year-old male brought to the ER from the [facility], with increased sedation and potential dehydration.   History is obtained from the patient, electronic health record and emergency department physician    History of Present Illness   Don Redd is a 66 year old male who presents with  with increased sedation and potential dehydration. He is  recovering thereafter a cervical neck fusion on August 18 at [facility]. This is done for cervical stenosis. Postoperatively he's had some hoarseness and has had a fair amount of bruising and swelling of his neck with bruising extending to his anterior chest wall. In the nursing home he is having a fair amount of pain and has been requiring oxycodone and is also taking gabapentine, Decadron, and muscle relaxers. With the past several days staff has noticed him to be more sedated with poor oral intake. Yesterday his creatinine was 1.78. He was encouraged drinking fluids and they cut back on his pain management. Today he seems more sedated and more dehydrated with a repeat creatinine elevated at 3.26. He normally has a normal creatinine. As I talk to him he has somewhat slurred speech, seems sleepy. He denies nausea or vomiting. He said decreased urination. His diabetes currently taking metformin, which is on hold. They notice higher blood sugars at the nursing home. He denies fever, chills. He denies cough. He denies abdominal pain.       Past Medical History    I have reviewed this patient's medical history and updated it with pertinent information if needed.   Past Medical History:   Diagnosis Date     Diabetes type 2, controlled (H)      Hypertension      Ureteral calculi 4/2016       Past Surgical History   I have reviewed this patient's surgical history and updated it with pertinent information if needed.  Past Surgical History:   Procedure Laterality Date     AS LUMBAR SPINE FUSN,POST INTRBDY  2011     CYSTOSCOPY       FUSION CERVICAL ANTERIOR TWO LEVELS  2011    C5-7       Prior to Admission Medications   Prior to Admission Medications   Prescriptions Last Dose Informant Patient Reported? Taking?   ASPIRIN PO Past Month at Unknown time  Yes Yes   Sig: Take 81 mg by mouth daily   Cholecalciferol (VITAMIN D-3) 1000 UNITS CAPS 8/30/2017 at 0826  Yes Yes   Sig: Take 1,000 Units by mouth daily    DEXAMETHASONE PO 8/30/2017 at 0826  Yes Yes   Sig: Take 2 mg by mouth See Admin Instructions    DIAZEPAM PO 8/29/2017 at 0510  Yes Yes   Sig: Take 5 mg by mouth every 6 hours as needed for anxiety   FAMOTIDINE PO 8/30/2017 at 826  Yes Yes   Sig: Take 20 mg by mouth 2 times daily   GABAPENTIN PO 8/30/2017 at 0826  Yes Yes   Sig: Take 600 mg by mouth 2 times daily    HYDROXYZINE PAMOATE PO 8/29/2017 at 0829  Yes No   Sig: Take 25 mg by mouth At Bedtime   METHOCARBAMOL PO 8/30/2017 at 1210  Yes Yes   Sig: Take 750 mg by mouth 4 times daily   OXYCODONE HCL PO 8/30/2017 at 0826  Yes Yes   Sig: Take 10 mg by mouth every 4 hours as needed    Ondansetron HCl (ZOFRAN PO) 8/29/2017 at 1221  Yes Yes   Sig: Take 4 mg by mouth every 6 hours as needed for nausea or vomiting   SIMVASTATIN PO 8/29/2017 at 2029  Yes Yes   Sig: Take 40 mg by mouth At Bedtime   ketoconazole (NIZORAL) 2 % shampoo 8/29/2017 at 1309  Yes Yes   Sig: Apply topically daily as needed for itching or irritation (also 2x weeklly)   lisinopril-hydrochlorothiazide (PRINZIDE/ZESTORETIC) 20-12.5 MG per tablet 8/30/2017 at 0826  Yes Yes   Sig: Take 2 tablets by mouth daily    metFORMIN (GLUCOPHAGE) 500 MG tablet 8/30/2017 at 0826  Yes Yes   Sig: Take 1,000 mg by mouth 2 times daily (with meals) Pt takes it in the AM and the evening.   polyethylene glycol (MIRALAX/GLYCOLAX) powder 8/30/2017 at 0826  Yes Yes   Sig: Take 17 g by mouth daily   senna-docusate (SENOKOT-S;PERICOLACE) 8.6-50 MG per tablet 8/30/2017 at 0826  Yes Yes   Sig: Take 2 tablets by mouth 2 times daily       Facility-Administered Medications: None     Allergies   No Known Allergies    Social History   I have reviewed this patient's social history and updated it with pertinent information if needed. Don Redd  reports that he has quit smoking. He does not have any smokeless tobacco history on file. He reports that he drinks alcohol. He reports that he does not use illicit drugs.    Family  History   I have reviewed this patient's family history and updated it with pertinent information if needed.   Family History   Problem Relation Age of Onset     Coronary Artery Disease Mother      Breast Cancer Mother      Hypertension Mother      Other Cancer Father      lung cancer       Review of Systems   The 10 point Review of Systems is negative other than noted in the HPI or here.     Physical Exam   Temp: 97.4  F (36.3  C) Temp src: Oral BP: 123/75 Pulse: 76   Resp: 12 SpO2: 95 %      Vital Signs with Ranges  Temp:  [97.4  F (36.3  C)-97.9  F (36.6  C)] 97.4  F (36.3  C)  Pulse:  [71-76] 76  Resp:  [12-18] 12  BP: ()/(52-76) 123/75  SpO2:  [90 %-99 %] 95 %  169 lbs 0 oz    EXAM:  Constitutional: alert, mild distress and cooperative   Cardiovascular: PMI normal. No lifts, heaves, or thrills. RRR. No murmurs, clicks gallops or rub  Respiratory: Percussion normal. Good diaphragmatic excursion. Lungs clear  Psychiatric: he seems sedated, although he is oriented to person place and time. Speech is somewhat slurred.  Head: Normocephalic. No masses, lesions, tenderness or abnormalities  Neck: neck has Steri-Strips across the anterior portion with a lot of bruising and swelling, this extends down to the anterior chest wall. It's minimally tender.  ENT: ears mouth and throat are normal, although his mouth is quite dry  Abdomen: Abdomen soft, non-tender. BS normal. No masses, organomegaly  NEURO: nonfocal, Reflexes normal and symmetric. Sensation grossly WNL.  SKIN: a lot of bruising over the neck and anterior chest wall  LYMPH: Normal cervical lymph nodes  JOINT/EXTREMITIES: extremities normal- no gross deformities noted and normal muscle tone     Data   Data reviewed today:  I personally reviewed no images or EKG's today.    Recent Labs  Lab 08/30/17  1255 08/29/17  1225   WBC 10.7 12.8*   HGB 13.8 13.5   MCV 81 81    322    135   POTASSIUM 4.2 4.2   CHLORIDE 90* 95   CO2 28 27   BUN 64* 41*   CR  3.26* 1.78*   ANIONGAP 15* 13   TOM 9.6 9.5   * 117*       No results found for this or any previous visit (from the past 24 hour(s)).[WG1.1]       Revision History        User Key Date/Time User Provider Type Action    > WG1.2 8/30/2017  9:21 PM Phil Chaudhari MD Physician Sign     WG1.1 8/30/2017  8:53 PM Phil Chaudhari MD Physician                   Discharge Summaries     No notes of this type exist for this encounter.         Consult Notes      Consults by Josephine Galdamez at 8/31/2017  4:08 PM     Author:  Josephine Galdamez Service:  Social Work Author Type:      Filed:  8/31/2017  4:08 PM Date of Service:  8/31/2017  4:08 PM Creation Time:  8/31/2017  4:03 PM    Status:  Signed :  Josephine Galdamez ()     Consult Orders:    1. Care Transition RN/SW IP Consult [[id number]] ordered by Arnaldo Farr MD at 08/31/17 1559                CARE TRANSITION SOCIAL WORK INITIAL ASSESSMENT:  Reason For Consult: discharge planning   Met with: Patient.    DATA  Principal Problem:    MILA (acute kidney injury) (H)  Active Problems:    Diabetes mellitus, type 2 (H)    Essential hypertension, benign    Stenosis of cervical spine with myelopathy    Encephalopathy          Primary Care MD Name: Evelio Luo  Contact information and PCP information verified: Yes      ASSESSMENT  Cognitive Status: awake, alert and oriented.             Lives With: facility resident  Living Arrangements: extended care facility  Quality Of Family Relationships: supportive, involved  Description of Support System: Supportive, Involved   Who is your support system?: Significant Other   Support Assessment: Adequate family and caregiver support   Insurance Concerns: No Insurance issues identified        This writer met with patient and introduced self and role. Discussed discharge planning.  Patient was admitted from [facility] and he states he plans to return there.  Discussed that patient  "does have a bed hold at [facility] that his insurance is paying for.  Also discussed transportation options to get back to [place].  Patient stated, \"My ex-wife, Stella, can take me.\"  Also talked about the option of wheelchair van transport and that he has insurance that will cover this service.  Patient stated that he has used [facility] before.        PLAN    Patient plans to return to [facility] when discharged from the hospital.     Discharge Planner   Discharge Plans in progress: yes   Barriers to discharge plan: No   Follow up plan:  Care Transitions will continue to follow during patient's hospital stay.         Entered by: RADHA KRFAT 08/31/2017 4:03 PM[SF1.1]                  Revision History        User Key Date/Time User Provider Type Action    > SF1.1 8/31/2017  4:08 PM Radha Kraft  Sign                     Progress Notes - Physician (Notes from 08/29/17 through 09/01/17)      Progress Notes by Radha Mccarty RD at 9/1/2017 10:02 AM     Author:  Radha Mccarty RD Service:  Nutrition Author Type:  Registered Dietitian    Filed:  9/1/2017 10:55 AM Date of Service:  9/1/2017 10:02 AM Creation Time:  9/1/2017 10:02 AM    Status:  Signed :  Radha Mccarty RD (Registered Dietitian)         CLINICAL NUTRITION SERVICES  -  ASSESSMENT NOTE    REASON FOR ASSESSMENT  Don Redd is a 66 year old male seen by Registered Dietitian for Admission Nutrition Risk Screen - unintentional loss of 10 lbs or more in the past 2 months; reduced oral intake over the last month    NUTRITION HISTORY  - Information obtained from chart and pt  -Pt diagnosed with MILA (likely secondary to dehydration from poor PO intake)  -Noted presented to ED with increased dehydration over the past several days  -Hx of DM2  -Noted loose stools[HW1.1],[HW1.2] confusion at times[HW1.1] and l[HW1.2]ittle appetite[HW1.1];[HW1.2] refused supper tray last night[HW1.1]  -Spoke with patient, reports he did eat some " "breakfast this morning  -Reports appetite has been poor, only 25% of baseline for the past couple weeks  -Reports  lbs and stated he has lost some weight recently do to medications and poor appetite[HW1.2]    CURRENT NUTRITION ORDERS  Diet Order:     Moderate Consistent Carbohydrate     Current Intake/Tolerance:  Eating 0% per flowsheets;[HW1.1] pt stated poor appetite[HW1.2]    PHYSICAL FINDINGS  Observed[HW1.1]  -Bruising to neck area[HW1.2]  Obtained from Chart/Interdisciplinary Team[HW1.1]  -A lot of bruising over the neck and anterior chest wall[HW1.2]    ANTHROPOMETRICS  Height: 5' 10.984\"  Weight: 187 lbs 6.26 oz  Body mass index is 26.15 kg/(m^2).  Weight Status:  Overweight BMI 25-29.9  IBW: 172 lbs  % IBW: 109%  Weight History:[HW1.1]   Wt Readings from Last 10 Encounters:   08/30/17 85 kg (187 lb 6.3 oz)   08/30/17 82.1 kg (181 lb)   08/29/17 82.1 kg (181 lb)   08/25/17 85.9 kg (189 lb 6.4 oz)   08/08/17 85 kg (187 lb 6.4 oz)   08/07/17 85.2 kg (187 lb 12.8 oz)   05/23/17 81.6 kg (180 lb)   12/24/16 83.4 kg (183 lb 13.8 oz)   05/22/15 81.6 kg (180 lb)[HW1.3]       LABS  Labs reviewed  Glucose (slighly elevated): Ranging  past 24 hours  GFR: 38 (L), >90 (improved)    MEDICATIONS  Medications reviewed    Dosing Weight 85 kg (CBW)    ASSESSED NUTRITION NEEDS PER APPROVED PRACTICE GUIDELINES:  Estimated Energy Needs: 6015-8554 kcals (25-30 Kcal/Kg)  Justification: maintenance  Estimated Protein Needs: 68 grams protein (0.8 g pro/Kg)  Justification: maintenance and MILA  Estimated Fluid Needs: (1 mL/Kcal)  Justification: maintenance    MALNUTRITION:  % Weight Loss:  None noted  % Intake:[HW1.1]  </= 50% for >/= 5 days (severe malnutrition)[HW1.2]  Subcutaneous Fat Loss:[HW1.1]  None observed[HW1.2]  Muscle Loss:[HW1.1]  None observed[HW1.2]  Fluid Retention:  None noted    Malnutrition Diagnosis:[HW1.1] Patient does not meet two of the above criteria necessary for diagnosing " malnutrition[HW1.2]    NUTRITION DIAGNOSIS:  Inadequate protein-energy intake related to decreased appetite as evidenced by intake < baseline for[HW1.1] past 2 weeks, estimated nutrient intake < estimated nutrient needs and pt reports of weight loss.[HW1.2]    NUTRITION INTERVENTIONS  Recommendations / Nutrition Prescription[HW1.1]  -Recommend Ensure BID (chocolate)  -Continue to monitor and encourage PO intake[HW1.2]    Implementation  Nutrition education:[HW1.1] Provided education on importance of getting adequate nutrition. Encouraged patient to consume as much as he can of meals and Ensure.   Medical Food Supplement[HW1.2]    Nutrition Goals[HW1.1]  Intake of meals/snacks/supplements > 75% before discharge.[HW1.2]     MONITORING AND EVALUATION:  Progress towards goals will be monitored and evaluated per protocol and Practice Guidelines    Radha Mccarty RD, LD  Clinical Dietitian  [phone][HW1.1]                 Revision History        User Key Date/Time User Provider Type Action    > HW1.2 9/1/2017 10:55 AM Radha Mccarty RD Registered Dietitian Sign     HW1.3 9/1/2017 10:16 AM Radha Mccarty RD Registered Dietitian      HW1.1 9/1/2017 10:02 AM Radha Mccarty RD Registered Dietitian             Progress Notes by Arnaldo Farr MD at 8/31/2017  1:43 PM     Author:  Arnaldo Farr MD Service:  Family Medicine Author Type:  Physician    Filed:  8/31/2017  1:48 PM Date of Service:  8/31/2017  1:43 PM Creation Time:  8/31/2017  1:43 PM    Status:  Signed :  Arnaldo Farr MD (Physician)         [facility]    Hospitalist Progress Note    Date of Service (when I saw the patient): 08/31/2017    Assessment & Plan   Don Redd is a 66 year old male who was admitted on 8/30/2017. His acute kidney injury is responding nicely to IV hydration.  Not near baseline yet.  His mental status is improved.  Some pain is present, but pt is not  interested in pain medications including tylenol at this time.  Does have loose stools, but he states that he wants to keep them loose for now.[DJ1.1]    Principal Problem:    MILA (acute kidney injury) (H)  Active Problems:    Diabetes mellitus, type 2 (H)    Essential hypertension, benign    Stenosis of cervical spine with myelopathy    Encephalopathy[DJ1.2]    Continue IV fluids for his MILA.  Monitor for fluid overload, but not currently an issue.  Continue current pain regimen, despite pt's insistence on not using it.  Resumed some lisinopril for elevated BP.    Watch blood sugars.    DVT Prophylaxis: Pneumatic Compression Devices  Code Status: Full Code    Disposition: Expected discharge in 1-2 days once renal function is near baseline as is mental status.    Arnaldo Farr MD    Interval History   Pt feeling better than yesterday.  More alert.  He does report pain in his neck, but doesn't really want to take pain medication for this.  His creatinine is improving.  He does report loose stools, but states he'd like to continue with loose stools at this time rather than back down on his stool softeners.    -Data reviewed today: I reviewed all new labs and imaging results over the last 24 hours. I personally reviewed no images or EKG's today.    Physical Exam   Temp: 96.8  F (36  C) Temp src: Oral BP: 145/76 Pulse: 73   Resp: 16 SpO2: 97 % O2 Device: None (Room air)    Vitals:    08/30/17 1509 08/30/17 1845   Weight: 169 lb (76.7 kg) 187 lb 6.3 oz (85 kg)     Vital Signs with Ranges  Temp:  [95.6  F (35.3  C)-97.4  F (36.3  C)] 96.8  F (36  C)  Pulse:  [73-88] 73  Resp:  [12-16] 16  BP: ()/(52-98) 145/76  SpO2:  [93 %-99 %] 97 %  I/O last 3 completed shifts:  In: 1688 [P.O.:240; I.V.:1448]  Out: 3000 [Urine:3000]    Constitutional: WDWN, alert  Respiratory: CTAB  Cardiovascular: RRR, 2-3/6 JOHNSON  GI: soft, NT, ND, no masses  Skin/Integumen: bruising near his neck incision, healing without  infection  Neuro: grossly intact  Other:     Medications     NaCl 175 mL/hr at 08/31/17 0917       lisinopril  10 mg Oral Daily     sodium chloride (PF)  3 mL Intracatheter Q8H     pneumococcal vaccine  0.5 mL Intramuscular Prior to discharge     famotidine (PEPCID) tablet 20 mg  20 mg Oral BID     polyethylene glycol  17 g Oral Daily     senna-docusate  2 tablet Oral BID     insulin aspart  1-7 Units Subcutaneous TID AC     insulin aspart  1-5 Units Subcutaneous At Bedtime       Data   Data reviewed today:  I personally reviewed his data.    Recent Labs  Lab 08/31/17  0531 08/30/17  1255 08/29/17  1225   WBC 7.0 10.7 12.8*   HGB 12.1* 13.8 13.5   MCV 82 81 81    312 322    133 135   POTASSIUM 4.0 4.2 4.2   CHLORIDE 101 90* 95   CO2 28 28 27   BUN 46* 64* 41*   CR 1.79* 3.26* 1.78*   ANIONGAP 10 15* 13   TOM 8.5 9.6 9.5   GLC 81 151* 117*       No results found for this or any previous visit (from the past 24 hour(s)).[DJ1.1]      Revision History        User Key Date/Time User Provider Type Action    > DJ1.2 8/31/2017  1:48 PM Arnaldo Farr MD Physician Sign     DJ1.1 8/31/2017  1:43 PM Arnaldo Farr MD Physician             Progress Notes by Oanh Jones RN at 8/31/2017  2:11 AM     Author:  Oanh Jones RN Service:  (none) Author Type:  Registered Nurse    Filed:  8/31/2017  2:18 AM Date of Service:  8/31/2017  2:11 AM Creation Time:  8/31/2017  2:11 AM    Status:  Signed :  Oanh Jones RN (Registered Nurse)         S-(situation): Patient arrives to room 267 via cart from ED.    B-(background): 66 yr old here from [facility], s/p anterior/posterior cervical surgery with incisions to both anterior and posterior neck.  Here for MILA/dehydration.    A-(assessment): Patient seems confused at times, uncertain of his baseline cognitive status.  Is difficult to understand due to slurred/garbled speech (?etiology).  Was unable to void x2, after bladder scan was  straight catheterized the first time and indwelling piper catheter inserted the 2nd time.  VSS, HR regular, lungs clear except for fine crackles auscultated in LLL.     R-(recommendations): Orders reviewed with patient. Will monitor patient per MD orders.     Inpatient nursing criteria listed below were met:    Health care directives status obtained and documented: Yes  Core Measures assessed (SSI): Yes  SCD's Documented: Yes  Vaccine assessment done and vaccines ordered if appropriate: Yes  Skin issues/needs documented:NA  Isolation needs addressed, if appropriate: NA  Fall Prevention: Care plan updated, Education given and documented Yes  MRSA swab completed for patient 55 years and older (exclude LUIS and TKA): Yes  My Chart patient sign up addressed and documented: No  Care Plan initiated: Yes  Education Assessment documented:Yes  Education Documented (Pre-existing chronic infection such as, MRSA/VRE need education on admission): Yes  New medication patient education completed and documented (Possible Side Effects of Common Medications handout): Yes  Home medications if not able to send immediately home with family stored here: NA   Reminder note placed in discharge instructions: NA  Discharge planning review completed (admission navigator) Yes[NE1.1]           Revision History        User Key Date/Time User Provider Type Action    > NE1.1 8/31/2017  2:18 AM Oanh Jones RN Registered Nurse Sign            ED Notes by Kate Yung RN at 8/30/2017  6:00 PM     Author:  Kate Yung RN Service:  (none) Author Type:  Registered Nurse    Filed:  8/30/2017  7:18 PM Date of Service:  8/30/2017  6:00 PM Creation Time:  8/30/2017  5:39 PM    Status:  Addendum :  Kate Yung RN (Registered Nurse)         Diagnosis: Dehydration and Acute Kidney injury.[LG1.1] Recent surgery of C-spine.[LG1.2]   Medical history:[LG1.1] Diabetes Mellitus, Hypertension, Lumbar disc disease, Spinal stenosis of  C-spine[LG1.2]  Vitals: Was Hypotensive now normotensive.   Abnormal Labs:[LG1.1] Results for CELINA WELDON (MRN [id number]) as of 8/30/2017 17:39   Ref. Range 8/30/2017 12:55   Sodium Latest Ref Range: 133 - 144 mmol/L 133   Potassium Latest Ref Range: 3.4 - 5.3 mmol/L 4.2   Chloride Latest Ref Range: 94 - 109 mmol/L 90 (L)   Carbon Dioxide Latest Ref Range: 20 - 32 mmol/L 28   Urea Nitrogen Latest Ref Range: 7 - 30 mg/dL 64 (H)   Creatinine Latest Ref Range: 0.66 - 1.25 mg/dL 3.26 (H)   GFR Estimate Latest Ref Range: >60 mL/min/1.7m2 19 (L)   GFR Estimate If Black Latest Ref Range: >60 mL/min/1.7m2 23 (L)   Calcium Latest Ref Range: 8.5 - 10.1 mg/dL 9.6   Anion Gap Latest Ref Range: 3 - 14 mmol/L 15 (H)   Glucose Latest Ref Range: 70 - 99 mg/dL 151 (H)[LG1.3]     Meds Given: 2.5 liters of NS   Pain: Controlled with Oxycodone  Skin Integrity: Bruising from Chin to chest  Belongings list done.  Special needs: Will need to have fluids pushed and is a fall risk.   Orders completed  Report given to:[LG1.1] Noa VUONG[LG1.4]       Revision History        User Key Date/Time User Provider Type Action    > LG1.4 8/30/2017  7:18 PM Kate Yung RN Registered Nurse Addend     LG1.2 8/30/2017  5:42 PM Kate Yung, RN Registered Nurse Addend     [N/A] 8/30/2017  5:39 PM Kate Yung, RN Registered Nurse Addend     LG1.3 8/30/2017  5:39 PM Kate Yung RN Registered Nurse Sign     LG1.1 8/30/2017  5:32 PM Kate Yung, RN Registered Nurse             ED Notes by Miri Gupta RN at 8/30/2017  5:53 PM     Author:  Miri Gupta RN Service:  (none) Author Type:  Registered Nurse    Filed:  8/30/2017  5:53 PM Date of Service:  8/30/2017  5:53 PM Creation Time:  8/30/2017  5:53 PM    Status:  Signed :  Miri Gupta RN (Registered Nurse)         MED REC complete.[SH1.1]      Revision History        User Key Date/Time User Provider Type Action    > SH1.1 8/30/2017  5:53 PM Miri Gupta RN Registered Nurse Sign             ED Notes by Kate Yung RN at 8/30/2017  5:27 PM     Author:  Kate Yung RN Service:  (none) Author Type:  Registered Nurse    Filed:  8/30/2017  5:32 PM Date of Service:  8/30/2017  5:27 PM Creation Time:  8/30/2017  5:32 PM    Status:  Signed :  Kate Yung RN (Registered Nurse)         Medication reconciliation not finished in triage or in ED. We have compared it to the MAR from [facility], but some meds are missing. We are asking that the Primary nurse please review the MAR that we have asked [facility] staff to send one more time.[LG1.1]      Revision History        User Key Date/Time User Provider Type Action    > LG1.1 8/30/2017  5:32 PM Kate Yung RN Registered Nurse Sign            ED Provider Notes by Reji Santamaria MD at 8/30/2017  3:04 PM     Author:  Reji Santamaria MD Service:  Emergency Medicine Author Type:  Physician    Filed:  8/30/2017  5:00 PM Date of Service:  8/30/2017  3:04 PM Creation Time:  8/30/2017  3:15 PM    Status:  Signed :  Reji Santamaria MD (Physician)           History[SL1.1]     Chief Complaint   Patient presents with     Abnormal Labs[BM1.1]     The history is provided by the patient, the spouse and the nursing home.[SL1.2]     Don Redd is a 66 year old male who[SL1.1] presents to the emergency department with concerns of abnormal labs. He was being seen at [facility] today for an episodic care visit and they were concerned about his abnormal lab results. They are concerned that he is moderately dehydrated, has kidney function abnormalities and is sedated due to medications. He had neck surgery on August 18th at [facility] with anterior and posterior incision sites. He has present ecchymosis from anterior incision site down to just above bilateral nipple line and no signs of infection at either posterior or anterior incision sites. He states that he has been experiencing a hoarse voice since the surgery. He says that he has decreased  urination. His wife endorses that he is on a high dose of decadron for the swelling and it is messing with his blood glucose levels, as they have been in the 400's for the past couple of days. He denies having any recent fevers. He does state that he had a halo applied for surgery.     1. Dehydration, moderate    2. Sedated due to medication    3. Spinal stenosis of cervical region    4. S/P cervical discectomy    - came to see [facility] today after having vomiting and sedation yesterday.  Most likely the oxycodone medication caused him over sedation in accumalation.  Had lowered his oxycodone from 15mg 5x day scheduled to 10mg QID but still holding medication based on his alertness.  Yesterday labs were taken and encouraged fluids since by the afternoon he was more alert and drinking after Zofran given.    This morning, staff gave him 10mg of oxycodone at 9am.  Still was saying pain at a 9/10  At lunch time he came out to eat lunch but stopped him by the desk to speak with him.  Pain more under control at around a 4/10.  Still does not feel well.  No vomiting today.  Able to answer questions.  Labs drawn after lunch time - BMP and CBC.  Labs came back showing worsening kidney function.  Gave him option of going into ED or stay here for IV therapy.[SL1.2]    I have reviewed the Medications, Allergies, Past Medical and Surgical History, and Social History in the Epic system.[SL1.1]    Patient Active Problem List   Diagnosis     Syncope, unspecified syncope type     Elevated lactic acid level     Diabetes mellitus, type 2 (H)     Essential hypertension, benign     Hyperlipidemia LDL goal <100     Backache     Balance problem     Colonic polyp     Diabetic neuropathy associated with type 2 diabetes mellitus (H)     Dyslipidemia     Ehrlichiosis     Fall at home     HTN (hypertension)     Hypertension     Lumbar degenerative disc disease     Obesity, unspecified     Spinal stenosis of cervical region     Stenosis of cervical  spine with myelopathy     Stenosis, cervical spine     Past Medical History:   Diagnosis Date     Diabetes type 2, controlled (H)      Hypertension      Ureteral calculi 4/2016     Past Surgical History:   Procedure Laterality Date     AS LUMBAR SPINE FUSN,POST INTRBDY  2011     CYSTOSCOPY       FUSION CERVICAL ANTERIOR TWO LEVELS  2011    C5-7     Family History   Problem Relation Age of Onset     Coronary Artery Disease Mother      Breast Cancer Mother      Hypertension Mother      Other Cancer Father      lung cancer     Social History   Substance Use Topics     Smoking status: Former Smoker     Smokeless tobacco: Not on file     Alcohol use Yes        There is no immunization history on file for this patient.     No Known Allergies    Current Outpatient Prescriptions   Medication Sig Dispense Refill     Ondansetron HCl (ZOFRAN PO) Take 4 mg by mouth every 6 hours as needed for nausea or vomiting       OXYCODONE HCL PO Take 10 mg by mouth every 4 hours as needed        senna-docusate (SENOKOT-S;PERICOLACE) 8.6-50 MG per tablet Take 2 tablets by mouth 2 times daily        ASPIRIN PO Take 81 mg by mouth daily       SIMVASTATIN PO Take 40 mg by mouth At Bedtime       Cholecalciferol (VITAMIN D-3) 1000 UNITS CAPS Take 1,000 Units by mouth daily       DIAZEPAM PO Take 5 mg by mouth every 6 hours as needed for anxiety       FAMOTIDINE PO Take 20 mg by mouth 2 times daily       HYDROXYZINE PAMOATE PO Take 25 mg by mouth At Bedtime       METHOCARBAMOL PO Take 750 mg by mouth 4 times daily       polyethylene glycol (MIRALAX/GLYCOLAX) powder Take 17 g by mouth daily       DEXAMETHASONE PO Take 2 mg by mouth See Admin Instructions        GABAPENTIN PO Take 600 mg by mouth 2 times daily        ketoconazole (NIZORAL) 2 % shampoo Apply topically daily as needed for itching or irritation (also 2x weeklly)       lisinopril-hydrochlorothiazide (PRINZIDE/ZESTORETIC) 20-12.5 MG per tablet Take 2 tablets by mouth daily  30 tablet   "    metFORMIN (GLUCOPHAGE) 500 MG tablet Take 1,000 mg by mouth 2 times daily (with meals) Pt takes it in the AM and the evening.[BM1.1]       Review of Systems   Constitutional: Negative for fever.   HENT: Positive for trouble swallowing.    Genitourinary: Positive for decreased urine volume.   Skin: Positive for color change (ecchymosis) and wound (surgical incision sites).   All other systems reviewed and are negative.[SL1.2]    Physical Exam   BP: (!) 87/66  Pulse: 76  Temp: 97.4  F (36.3  C)  Resp: 12  Height: 180.3 cm (5' 11\")  Weight: 76.7 kg (169 lb)  SpO2: 93 %[SL1.1]  Physical Exam   Constitutional: He is oriented to person, place, and time. He appears well-developed and well-nourished. He has a[SL1.2] sickly appearance[SL1.3].   HENT:   Head: Atraumatic.   Oropharynx dry   Eyes: EOM are normal.[SL1.2]   Ecchymosis around left eye.[SL1.4]    Neck:       Both incision sites are clean and dry with no signs of infection.   Cardiovascular: Normal rate and regular rhythm.[SL1.2]    Murmur[SL1.4] heard.[SL1.2]   Systolic[SL1.4] murmur is present with a grade of[SL1.2] 4[SL1.4]/6   Pulmonary/Chest: Effort normal and breath sounds normal.   Abdominal: Soft. Bowel sounds are normal.   Musculoskeletal: Normal range of motion.[SL1.2]   Strength intact to upper extremities.[SL1.4]    Neurological: He is alert and oriented to person, place, and time.   Skin: Skin is warm and dry. Ecchymosis (from anterior incision site to just above bilateral nipple line) noted. There is[SL1.2] pallor[SL1.3].   Psychiatric: He has a normal mood and affect. His behavior is normal. His speech is[SL1.2] delayed[SL1.3].   Nursing note and vitals reviewed.[SL1.2]    ED Course[SL1.1]     ED Course[BM1.1]     Procedures             Critical Care time:[SL1.1]  none[BM1.2]          Results for orders placed or performed in visit on 08/30/17 (from the past 24 hour(s))   Basic metabolic panel   Result Value Ref Range    Sodium 133 133 - 144 " mmol/L    Potassium 4.2 3.4 - 5.3 mmol/L    Chloride 90 (L) 94 - 109 mmol/L    Carbon Dioxide 28 20 - 32 mmol/L    Anion Gap 15 (H) 3 - 14 mmol/L    Glucose 151 (H) 70 - 99 mg/dL    Urea Nitrogen 64 (H) 7 - 30 mg/dL    Creatinine 3.26 (H) 0.66 - 1.25 mg/dL    GFR Estimate 19 (L) >60 mL/min/1.7m2    GFR Estimate If Black 23 (L) >60 mL/min/1.7m2    Calcium 9.6 8.5 - 10.1 mg/dL   CBC with platelets differential   Result Value Ref Range    WBC 10.7 4.0 - 11.0 10e9/L    RBC Count 4.99 4.4 - 5.9 10e12/L    Hemoglobin 13.8 13.3 - 17.7 g/dL    Hematocrit 40.6 40.0 - 53.0 %    MCV 81 78 - 100 fl    MCH 27.7 26.5 - 33.0 pg    MCHC 34.0 31.5 - 36.5 g/dL    RDW 14.1 10.0 - 15.0 %    Platelet Count 312 150 - 450 10e9/L    Diff Method Automated Method     % Neutrophils 75.1 %    % Lymphocytes 13.8 %    % Monocytes 9.1 %    % Eosinophils 1.2 %    % Basophils 0.1 %    % Immature Granulocytes 0.7 %    Absolute Neutrophil 8.1 1.6 - 8.3 10e9/L    Absolute Lymphocytes 1.5 0.8 - 5.3 10e9/L    Absolute Monocytes 1.0 0.0 - 1.3 10e9/L    Absolute Eosinophils 0.1 0.0 - 0.7 10e9/L    Absolute Basophils 0.0 0.0 - 0.2 10e9/L    Abs Immature Granulocytes 0.1 0 - 0.4 10e9/L     Medications   0.9% sodium chloride infusion ( Intravenous New Bag 8/30/17 1651)   0.9% sodium chloride BOLUS (0 mLs Intravenous Stopped 8/30/17 1650)[BM1.1]     Assessments & Plan (with Medical Decision Making)[SL1.1]  66-year-old male who is 12 days postoperative from cervical spine surgery.  He has been at the local nursing home and I received a call today as there was concerns for acute kidney injury and dehydration.  Exam here and labs are most consistent with this.  No signs of infection otherwise.  Specifically, normal white count and no fever.  Incisions look clean and dry.  Given 2 L of IV fluid with considerable improvement in blood pressures.  Will be admitted to the hospitalist service as discussed with Dr. Farr.[BM1.2]       I have reviewed the nursing  notes.    I have reviewed the findings, diagnosis, plan and need for follow up with the patient.[SL1.1]       New Prescriptions    No medications on file       Final diagnoses:   MILA (acute kidney injury) (H)   Dehydration[BM1.1]     This document serves as a record of services personally performed by Reji Santamaria MD. It was created on their behalf by Nicolle Arguelles, a trained medical scribe. The creation of this record is based on the provider's personal observations and the statements of the patient. This document has been checked and approved by the attending provider.    Note: Chart documentation done in part with Dragon Voice Recognition software. Although reviewed after completion, some word and grammatical errors may remain.[SL1.4]    8/30/2017   [facility] EMERGENCY DEPARTMENT[SL1.1]     Reji Santamaria MD  08/30/17 1700  [BM1.3]     Revision History        User Key Date/Time User Provider Type Action    > BM1.3 8/30/2017  5:00 PM Reji Santamaria MD Physician Sign     BM1.1 8/30/2017  4:59 PM Reji Santamaria MD Physician      BM1.2 8/30/2017  4:58 PM Reji Santamaria MD Physician      SL1.4 8/30/2017  4:24 PM Nicolle Arguelles Scribe Share     SL1.3 8/30/2017  4:17 PM Lershari, Nicolle Scribe Share     SL1.2 8/30/2017  4:14 PM Lershari, Nicolle Scribe Share     SL1.1 8/30/2017  3:21 PM Blane, Nicolle Scribe Share            ED Notes by Kate Yung, RN at 8/30/2017  4:10 PM     Author:  Kate Yung, RN Service:  (none) Author Type:  Registered Nurse    Filed:  8/30/2017  4:10 PM Date of Service:  8/30/2017  4:10 PM Creation Time:  8/30/2017  4:10 PM    Status:  Signed :  Kate Yung, RN (Registered Nurse)         1 liter of NS in, and BP is better.[LG1.1]      Revision History        User Key Date/Time User Provider Type Action    > LG1.1 8/30/2017  4:10 PM Kate Yung, RN Registered Nurse Sign            ED Notes by Miri Gupta, RN at 8/30/2017  3:55 PM     Author:  Miri Gupta,  RN Service:  (none) Author Type:  Registered Nurse    Filed:  8/30/2017  3:55 PM Date of Service:  8/30/2017  3:55 PM Creation Time:  8/30/2017  3:55 PM    Status:  Signed :  Miri Gupta RN (Registered Nurse)         Called [facility] for Updated MAR.[SH1.1]      Revision History        User Key Date/Time User Provider Type Action    > SH1.1 8/30/2017  3:55 PM Miri Gupta RN Registered Nurse Sign            ED Notes by Kate Yung RN at 8/30/2017  3:04 PM     Author:  Kate Yung RN Service:  (none) Author Type:  Registered Nurse    Filed:  8/30/2017  3:22 PM Date of Service:  8/30/2017  3:04 PM Creation Time:  8/30/2017  3:04 PM    Status:  Addendum :  Kate Yung RN (Registered Nurse)         Bed: ED03  Expected date:[SL1.1] Today[LG1.1]  Means of arrival: EMS[SL1.1]     Revision History        User Key Date/Time User Provider Type Action    > LG1.1 8/30/2017  3:22 PM Kate Yung RN Registered Nurse Addend     SL1.1 8/30/2017  3:04 PM Gloria Martinez RN Registered Nurse Sign            ED Notes by Kate Yung RN at 8/30/2017  3:13 PM     Author:  Kate Yung RN Service:  (none) Author Type:  Registered Nurse    Filed:  8/30/2017  3:21 PM Date of Service:  8/30/2017  3:13 PM Creation Time:  8/30/2017  3:13 PM    Status:  Addendum :  Kate Yung RN (Registered Nurse)         Pt has been at [facility] recovering from neck surgery that he had at [facility] on August 18th. Today they found that his Creatinine was elevated so they transferred him to the hospital. He is a ltitle foggy in his mentation[LG1.1], His pupils are constricted. His blood glucose was 207 from EMS.[LG1.2] When he was being transferred they noted that he had not had his Metformin since surgery.[LG1.1] Oral pharynx is dry, Neck suture line[LG1.2] anterior and posterior are clean and dry and are[LG1.3] healing and non reddened.[LG1.2] Bruises from chin to chest anteriorly.[LG1.3]      Revision History         User Key Date/Time User Provider Type Action    > LG1.3 8/30/2017  3:21 PM Kate Yung, RN Registered Nurse Addend     LG1.2 8/30/2017  3:19 PM Kate Yung RN Registered Nurse Addend     LG1.1 8/30/2017  3:16 PM Kate Yung, RN Registered Nurse Sign                  Procedure Notes     No notes of this type exist for this encounter.      Progress Notes - Therapies (Notes from 08/29/17 through 09/01/17)     No notes of this type exist for this encounter.

## 2017-08-30 NOTE — ED NOTES
Diagnosis: Dehydration and Acute Kidney injury. Recent surgery of C-spine.   Medical history: Diabetes Mellitus, Hypertension, Lumbar disc disease, Spinal stenosis of C-spine  Vitals: Was Hypotensive now normotensive.   Abnormal Labs: Results for CELINA WELDON (MRN [id number]) as of 8/30/2017 17:39   Ref. Range 8/30/2017 12:55   Sodium Latest Ref Range: 133 - 144 mmol/L 133   Potassium Latest Ref Range: 3.4 - 5.3 mmol/L 4.2   Chloride Latest Ref Range: 94 - 109 mmol/L 90 (L)   Carbon Dioxide Latest Ref Range: 20 - 32 mmol/L 28   Urea Nitrogen Latest Ref Range: 7 - 30 mg/dL 64 (H)   Creatinine Latest Ref Range: 0.66 - 1.25 mg/dL 3.26 (H)   GFR Estimate Latest Ref Range: >60 mL/min/1.7m2 19 (L)   GFR Estimate If Black Latest Ref Range: >60 mL/min/1.7m2 23 (L)   Calcium Latest Ref Range: 8.5 - 10.1 mg/dL 9.6   Anion Gap Latest Ref Range: 3 - 14 mmol/L 15 (H)   Glucose Latest Ref Range: 70 - 99 mg/dL 151 (H)     Meds Given: 2.5 liters of NS   Pain: Controlled with Oxycodone  Skin Integrity: Bruising from Chin to chest  Belongings list done.  Special needs: Will need to have fluids pushed and is a fall risk.   Orders completed  Report given to: Noa VUONG

## 2017-08-30 NOTE — IP AVS SNAPSHOT
` `     [address] SURGICAL: [phone]            Medication Administration Report for Don Redd as of 09/01/17 1111   Legend:    Given Hold Not Given Due Canceled Entry Other Actions    Time Time (Time) Time  Time-Action       Inactive    Active    Linked        Medications 08/26/17 08/27/17 08/28/17 08/29/17 08/30/17 08/31/17 09/01/17    acetaminophen (TYLENOL) tablet 650 mg  Dose: 650 mg Freq: EVERY 4 HOURS PRN Route: PO  PRN Reason: mild pain  Start: 08/31/17 1808   Admin Instructions: Maximum acetaminophen dose from all sources = 75 mg/kg/day not to exceed 4 grams/day.          1818 (650 mg)-Given       2318 (650 mg)-Given        0337 (650 mg)-Given       0810 (650 mg)-Given           famotidine (PEPCID) tablet 20 mg  Dose: 20 mg Freq: 2 TIMES DAILY Route: PO  Start: 08/30/17 2100        2144 (20 mg)-Given        0915 (20 mg)-Given       1923 (20 mg)-Given               0810 (20 mg)-Given       [ ] 2100           glucose 40 % gel 15-30 g  Dose: 15-30 g Freq: EVERY 15 MIN PRN Route: PO  PRN Reason: low blood sugar  Start: 08/30/17 2052   Admin Instructions: Give 15 g for BG 51 to 69 mg/dL IF patient is conscious and able to swallow. Give 30 g for BG less than or equal to 50 mg/dL IF patient is conscious and able to swallow. Do NOT give glucose gel via enteral tube.  IF patient has enteral tube: give apple juice 120 mL (4 oz or 15 g of CHO) via enteral tube for BG 51 to 69 mg/dL.  Give apple juice 240 mL (8 oz or 30 g of CHO) via enteral tube for BG less than or equal to 50 mg/dL.    ~Oral gel is preferable for conscious and able to swallow patient.   ~IF gel unavailable or patient refuses may provide apple juice 120 mL (4 oz or 15 g of CHO). Document juice on I and O flowsheet.              Or  dextrose 50 % injection 25-50 mL  Dose: 25-50 mL Freq: EVERY 15 MIN PRN Route: IV  PRN Reason: low blood sugar  Start: 08/30/17 2052   Admin Instructions: Use if have IV access, BG less than 70  mg/dL and meet dose criteria below:  Dose if conscious and alert (or disorientated) and NPO = 25 mL  Dose if unconscious / not alert = 50 mL  Vesicant.              Or  glucagon injection 1 mg  Dose: 1 mg Freq: EVERY 15 MIN PRN Route: SC  PRN Reason: low blood sugar  PRN Comment: May repeat x 1 only  Start: 08/30/17 2052   Admin Instructions: May give SQ or IM. ONLY use glucagon IF patient has NO IV access AND is UNABLE to swallow AND blood glucose is LESS than or EQUAL to 50 mg/dL.               insulin aspart (NovoLOG) inj (RAPID ACTING)  Dose: 1-5 Units Freq: AT BEDTIME Route: SC  Start: 08/30/17 2100   Admin Instructions: MEDIUM INSULIN RESISTANCE DOSING    Do Not give Bedtime Correction Insulin if BG less than  200.   For  - 249 give 1 units.   For  - 299 give 2 units.   For  - 349 give 3 units.   For  -399 give 4 units.   For BG greater than or equal to 400 give 5 units.  Notify provider if glucose greater than or equal to 350 mg/dL after administration of correction dose.  If given at mealtime, must be administered 5 min before meal or immediately after.         (2257)-Not Given        (2059)-Not Given        [ ] 2100           insulin aspart (NovoLOG) inj (RAPID ACTING)  Dose: 1-7 Units Freq: 3 TIMES DAILY BEFORE MEALS Route: SC  Start: 08/31/17 0800   Admin Instructions: Correction Scale - MEDIUM INSULIN RESISTANCE DOSING     Do Not give Correction Insulin if Pre-Meal BG less than 140.   For Pre-Meal  - 189 give 1 unit.   For Pre-Meal  - 239 give 2 units.   For Pre-Meal  - 289 give 3 units.   For Pre-Meal  - 339 give 4 units.   For Pre-Meal - 399 give 5 units.   For Pre-Meal -449 give 6 units  For Pre-Meal BG greater than or equal to 450 give 7 units.   To be given with prandial insulin, and based on pre-meal blood glucose.    Notify provider if glucose greater than or equal to 350 mg/dL after administration of correction dose.  If given at  "mealtime, must be administered 5 min before meal or immediately after.          0827-Hold       1217-Hold       1733 (1 Units)-Given        (0744)-Not Given       [ ] 1200       [ ] 1700           lidocaine (LMX4) kit  Freq: EVERY 1 HOUR PRN Route: Top  PRN Reason: pain  PRN Comment: with VAD insertion or accessing implanted port.  Start: 08/30/17 1830   Admin Instructions: Do NOT give if patient has a history of allergy to any local anesthetic or any \"estuardo\" product.   Apply 30 minutes prior to VAD insertion or port access.  MAX Dose:  2.5 g (  of 5 g tube)               lidocaine 1 % 1 mL  Dose: 1 mL Freq: EVERY 1 HOUR PRN Route: OTHER  PRN Comment: mild pain with VAD insertion or accessing implanted port  Start: 08/30/17 1830   Admin Instructions: Do NOT give if patient has a history of allergy to any local anesthetic or any \"estuardo\" product. MAX dose 1 mL subcutaneous OR intradermal in divided doses.                 Dose: 10 mg Freq: DAILY Route: PO  Start: 08/31/17 0945   End: 09/01/17 1040         1115 (10 mg)-Given        0809 (10 mg)-Given       1040-Med Discontinued       lisinopril-hydrochlorothiazide (PRINZIDE/ZESTORETIC) 20-12.5 MG per tablet 1 tablet  Dose: 1 tablet Freq: DAILY Route: PO  Start: 09/01/17 1039          [ ] 1045           naloxone (NARCAN) injection 0.1-0.4 mg  Dose: 0.1-0.4 mg Freq: EVERY 2 MIN PRN Route: IV  PRN Reason: opioid reversal  Start: 08/30/17 1830   Admin Instructions: For respiratory rate LESS than or EQUAL to 8.  Partial reversal dose:  0.1 mg titrated q 2 minutes for Analgesia Side Effects Monitoring Sedation Level of 3 (frequently drowsy, arousable, drifts to sleep during conversation).Full reversal dose:  0.4 mg bolus for Analgesia Side Effects Monitoring Sedation Level of 4 (somnolent, minimal or no response to stimulation).               oxyCODONE (ROXICODONE) IR tablet 5 mg  Dose: 5 mg Freq: EVERY 4 HOURS PRN Route: PO  PRN Reason: moderate to severe pain  Start: " 08/30/17 2052              pneumococcal vaccine (PNEUMOVAX 23-светлана) injection 0.5 mL  Dose: 0.5 mL Freq: PRIOR TO DISCHARGE Route: IM  Start: 08/31/17 1000   Admin Instructions: Administer when afebrile (LESs than 100.4 ) x 24 hr OR prior to discharge.   Give Fact Sheet to Patient.  If patient is febrile, reassess in 8 hrs or every shift. Minor illnesses with or without fever does NOT contraindicate the vaccination. Inject into the anterolateral aspect of the thigh (infants) or deltoid muscle (toddlers/children); do not inject in the gluteal area, or in areas of major nerve trunks and/or blood vessels. IM administration for children 6 months to 2 years should be vaccinated in the anterolateral aspect of thigh.  If not administering when scheduled , change the due time by following the instructions in the reference link below. If patient refuses vaccine, chart as Vaccine Refused.             [ ] 1100           polyethylene glycol (MIRALAX/GLYCOLAX) Packet 17 g  Dose: 17 g Freq: DAILY Route: PO  Start: 08/31/17 0900   Admin Instructions: 1 Packet = 17 grams. Mixed prescribed dose in 8 ounces of water.  1 Packet = 17 grams. Mixed prescribed dose in 8 ounces of water. Follow with 8 oz. of water.          0915 (17 g)-Given        0810 (17 g)-Given           senna-docusate (SENOKOT-S;PERICOLACE) 8.6-50 MG per tablet 2 tablet  Dose: 2 tablet Freq: 2 TIMES DAILY Route: PO  Start: 08/30/17 2100        2144 (2 tablet)-Given        0915 (2 tablet)-Given       1923 (2 tablet)-Given               0810 (2 tablet)-Given       [ ] 2100           sodium chloride (PF) 0.9% PF flush 3 mL  Dose: 3 mL Freq: EVERY 8 HOURS Route: IK  Start: 08/30/17 1845   Admin Instructions: And Q1H PRN, to lock peripheral IV dormant line.         (2147)-Not Given        (0227)-Not Given       (1052)-Not Given       (1853)-Not Given        (0437)-Not Given       [ ] 1045       [ ] 1845           sodium chloride (PF) 0.9% PF flush 3 mL  Dose: 3 mL Freq:  EVERY 1 HOUR PRN Route: IK  PRN Reason: line flush  PRN Comment: for peripheral IV flush post IV meds  Start: 08/30/17 1830             Completed Medications  Medications 08/26/17 08/27/17 08/28/17 08/29/17 08/30/17 08/31/17 09/01/17         Dose: 2,000 mL Freq: ONCE Route: IV  Last Dose: Stopped (08/30/17 1650)  Start: 08/30/17 1525   End: 08/30/17 1650        1548 (2,000 mL)-New Bag       1650-Stopped            Discontinued Medications  Medications 08/26/17 08/27/17 08/28/17 08/29/17 08/30/17 08/31/17 09/01/17         Rate: 50 mL/hr Freq: CONTINUOUS Route: IV  Start: 08/30/17 2100   End: 09/01/17 0939        2147 ( )-New Bag        0325 ( )-New Bag       0917 ( )-New Bag       1502 ( )-New Bag       1925 ( )-New Bag        0108 ( )-New Bag       0709 ( )-New Bag       0757 ( )-Rate/Dose Change       0939-Med Discontinued         Rate: 250 mL/hr Freq: CONTINUOUS Route: IV  Last Dose: Stopped (08/30/17 1808)  Start: 08/30/17 1645   End: 08/30/17 2053 1651 ( )-New Bag       1808-ED Infusing on Admission/transfer       1839-Stopped       2053-Med Discontinued           Rate: 150 mL/hr Freq: CONTINUOUS Route: IV  Start: 08/30/17 1845   End: 08/30/17 2053 1839 ( )-New Bag       2053-Med Discontinued

## 2017-08-30 NOTE — ED NOTES
Pt has been at [facility] recovering from neck surgery that he had at [facility] on August 18th. Today they found that his Creatinine was elevated so they transferred him to the hospital. He is a ltitle foggy in his mentation, His pupils are constricted. His blood glucose was 207 from EMS. When he was being transferred they noted that he had not had his Metformin since surgery. Oral pharynx is dry, Neck suture line anterior and posterior are clean and dry and are healing and non reddened. Bruises from chin to chest anteriorly.

## 2017-08-31 LAB
ANION GAP SERPL CALCULATED.3IONS-SCNC: 10 MMOL/L (ref 3–14)
BUN SERPL-MCNC: 46 MG/DL (ref 7–30)
CALCIUM SERPL-MCNC: 8.5 MG/DL (ref 8.5–10.1)
CHLORIDE SERPL-SCNC: 101 MMOL/L (ref 94–109)
CO2 SERPL-SCNC: 28 MMOL/L (ref 20–32)
CREAT SERPL-MCNC: 1.79 MG/DL (ref 0.66–1.25)
ERYTHROCYTE [DISTWIDTH] IN BLOOD BY AUTOMATED COUNT: 13.7 % (ref 10–15)
GFR SERPL CREATININE-BSD FRML MDRD: 38 ML/MIN/1.7M2
GLUCOSE BLDC GLUCOMTR-MCNC: 110 MG/DL (ref 70–99)
GLUCOSE BLDC GLUCOMTR-MCNC: 135 MG/DL (ref 70–99)
GLUCOSE BLDC GLUCOMTR-MCNC: 156 MG/DL (ref 70–99)
GLUCOSE BLDC GLUCOMTR-MCNC: 75 MG/DL (ref 70–99)
GLUCOSE SERPL-MCNC: 81 MG/DL (ref 70–99)
HBA1C MFR BLD: 6.8 % (ref 4.3–6)
HCT VFR BLD AUTO: 35.1 % (ref 40–53)
HGB BLD-MCNC: 12.1 G/DL (ref 13.3–17.7)
MCH RBC QN AUTO: 28.2 PG (ref 26.5–33)
MCHC RBC AUTO-ENTMCNC: 34.5 G/DL (ref 31.5–36.5)
MCV RBC AUTO: 82 FL (ref 78–100)
PLATELET # BLD AUTO: 220 10E9/L (ref 150–450)
POTASSIUM SERPL-SCNC: 4 MMOL/L (ref 3.4–5.3)
RBC # BLD AUTO: 4.29 10E12/L (ref 4.4–5.9)
SODIUM SERPL-SCNC: 139 MMOL/L (ref 133–144)
WBC # BLD AUTO: 7 10E9/L (ref 4–11)

## 2017-08-31 PROCEDURE — 12000000 ZZH R&B MED SURG/OB

## 2017-08-31 PROCEDURE — 99232 SBSQ HOSP IP/OBS MODERATE 35: CPT | Performed by: FAMILY MEDICINE

## 2017-08-31 PROCEDURE — 25000128 H RX IP 250 OP 636: Performed by: FAMILY MEDICINE

## 2017-08-31 PROCEDURE — 25000132 ZZH RX MED GY IP 250 OP 250 PS 637: Performed by: FAMILY MEDICINE

## 2017-08-31 PROCEDURE — 36415 COLL VENOUS BLD VENIPUNCTURE: CPT | Performed by: FAMILY MEDICINE

## 2017-08-31 PROCEDURE — 25000131 ZZH RX MED GY IP 250 OP 636 PS 637: Performed by: FAMILY MEDICINE

## 2017-08-31 PROCEDURE — 87081 CULTURE SCREEN ONLY: CPT | Performed by: FAMILY MEDICINE

## 2017-08-31 PROCEDURE — 80048 BASIC METABOLIC PNL TOTAL CA: CPT | Performed by: FAMILY MEDICINE

## 2017-08-31 PROCEDURE — 85027 COMPLETE CBC AUTOMATED: CPT | Performed by: FAMILY MEDICINE

## 2017-08-31 PROCEDURE — 83036 HEMOGLOBIN GLYCOSYLATED A1C: CPT | Performed by: FAMILY MEDICINE

## 2017-08-31 PROCEDURE — 00000146 ZZHCL STATISTIC GLUCOSE BY METER IP

## 2017-08-31 PROCEDURE — 99207 ZZC MOONLIGHTING INDICATOR: CPT | Performed by: FAMILY MEDICINE

## 2017-08-31 RX ORDER — LISINOPRIL 10 MG/1
10 TABLET ORAL DAILY
Status: DISCONTINUED | OUTPATIENT
Start: 2017-08-31 | End: 2017-09-01

## 2017-08-31 RX ORDER — ACETAMINOPHEN 325 MG/1
650 TABLET ORAL EVERY 4 HOURS PRN
Status: DISCONTINUED | OUTPATIENT
Start: 2017-08-31 | End: 2017-09-01 | Stop reason: HOSPADM

## 2017-08-31 RX ADMIN — ACETAMINOPHEN 650 MG: 325 TABLET ORAL at 18:18

## 2017-08-31 RX ADMIN — ACETAMINOPHEN 650 MG: 325 TABLET ORAL at 23:18

## 2017-08-31 RX ADMIN — SENNOSIDES AND DOCUSATE SODIUM 2 TABLET: 8.6; 5 TABLET ORAL at 19:23

## 2017-08-31 RX ADMIN — INSULIN ASPART 1 UNITS: 100 INJECTION, SOLUTION INTRAVENOUS; SUBCUTANEOUS at 17:33

## 2017-08-31 RX ADMIN — LISINOPRIL 10 MG: 10 TABLET ORAL at 11:15

## 2017-08-31 RX ADMIN — SENNOSIDES AND DOCUSATE SODIUM 2 TABLET: 8.6; 5 TABLET ORAL at 09:15

## 2017-08-31 RX ADMIN — SODIUM CHLORIDE: 9 INJECTION, SOLUTION INTRAVENOUS at 03:25

## 2017-08-31 RX ADMIN — SODIUM CHLORIDE: 9 INJECTION, SOLUTION INTRAVENOUS at 09:17

## 2017-08-31 RX ADMIN — POLYETHYLENE GLYCOL 3350 17 G: 17 POWDER, FOR SOLUTION ORAL at 09:15

## 2017-08-31 RX ADMIN — FAMOTIDINE 20 MG: 20 TABLET ORAL at 19:23

## 2017-08-31 RX ADMIN — SODIUM CHLORIDE: 9 INJECTION, SOLUTION INTRAVENOUS at 19:25

## 2017-08-31 RX ADMIN — FAMOTIDINE 20 MG: 20 TABLET ORAL at 09:15

## 2017-08-31 RX ADMIN — SODIUM CHLORIDE: 9 INJECTION, SOLUTION INTRAVENOUS at 15:02

## 2017-08-31 NOTE — PLAN OF CARE
Problem: Goal Outcome Summary  Goal: Goal Outcome Summary  Patient alert and oriented to person, time and situation- unsure of where he is at times. Complains of neck pain but declines any interventions. Patient with Rock draining clear, yellow urine. Fluids running. Patient had 2 BMs on thsi shift. Up with stand by assist of 1 person and walker.

## 2017-08-31 NOTE — PROGRESS NOTES
[facility]    Hospitalist Progress Note    Date of Service (when I saw the patient): 08/31/2017    Assessment & Plan   Don Redd is a 66 year old male who was admitted on 8/30/2017. His acute kidney injury is responding nicely to IV hydration.  Not near baseline yet.  His mental status is improved.  Some pain is present, but pt is not interested in pain medications including tylenol at this time.  Does have loose stools, but he states that he wants to keep them loose for now.    Principal Problem:    MILA (acute kidney injury) (H)  Active Problems:    Diabetes mellitus, type 2 (H)    Essential hypertension, benign    Stenosis of cervical spine with myelopathy    Encephalopathy    Continue IV fluids for his MILA.  Monitor for fluid overload, but not currently an issue.  Continue current pain regimen, despite pt's insistence on not using it.  Resumed some lisinopril for elevated BP.    Watch blood sugars.    DVT Prophylaxis: Pneumatic Compression Devices  Code Status: Full Code    Disposition: Expected discharge in 1-2 days once renal function is near baseline as is mental status.    Arnaldo Farr MD    Interval History   Pt feeling better than yesterday.  More alert.  He does report pain in his neck, but doesn't really want to take pain medication for this.  His creatinine is improving.  He does report loose stools, but states he'd like to continue with loose stools at this time rather than back down on his stool softeners.    -Data reviewed today: I reviewed all new labs and imaging results over the last 24 hours. I personally reviewed no images or EKG's today.    Physical Exam   Temp: 96.8  F (36  C) Temp src: Oral BP: 145/76 Pulse: 73   Resp: 16 SpO2: 97 % O2 Device: None (Room air)    Vitals:    08/30/17 1509 08/30/17 1845   Weight: 169 lb (76.7 kg) 187 lb 6.3 oz (85 kg)     Vital Signs with Ranges  Temp:  [95.6  F (35.3  C)-97.4  F (36.3  C)] 96.8  F (36  C)  Pulse:   [73-88] 73  Resp:  [12-16] 16  BP: ()/(52-98) 145/76  SpO2:  [93 %-99 %] 97 %  I/O last 3 completed shifts:  In: 1688 [P.O.:240; I.V.:1448]  Out: 3000 [Urine:3000]    Constitutional: WDWN, alert  Respiratory: CTAB  Cardiovascular: RRR, 2-3/6 JOHNSON  GI: soft, NT, ND, no masses  Skin/Integumen: bruising near his neck incision, healing without infection  Neuro: grossly intact  Other:     Medications     NaCl 175 mL/hr at 08/31/17 0917       lisinopril  10 mg Oral Daily     sodium chloride (PF)  3 mL Intracatheter Q8H     pneumococcal vaccine  0.5 mL Intramuscular Prior to discharge     famotidine (PEPCID) tablet 20 mg  20 mg Oral BID     polyethylene glycol  17 g Oral Daily     senna-docusate  2 tablet Oral BID     insulin aspart  1-7 Units Subcutaneous TID AC     insulin aspart  1-5 Units Subcutaneous At Bedtime       Data   Data reviewed today:  I personally reviewed his data.    Recent Labs  Lab 08/31/17  0531 08/30/17  1255 08/29/17  1225   WBC 7.0 10.7 12.8*   HGB 12.1* 13.8 13.5   MCV 82 81 81    312 322    133 135   POTASSIUM 4.0 4.2 4.2   CHLORIDE 101 90* 95   CO2 28 28 27   BUN 46* 64* 41*   CR 1.79* 3.26* 1.78*   ANIONGAP 10 15* 13   TOM 8.5 9.6 9.5   GLC 81 151* 117*       No results found for this or any previous visit (from the past 24 hour(s)).

## 2017-08-31 NOTE — H&P
[facility]    History and Physical  Hospitalist       Date of Admission:  8/30/2017  Date of Service (when I saw the patient): 08/30/17    Assessment & Plan   Don Redd is a 66 year old male who presents with increased sedation and laboratory values showing increased dehydration over the past several days. He is rehabbing at a local nursing home after having cervical neck fusion at [facility] on August 18, 2017. In the nursing home over the past day he's had increased sedation which is thought secondary to his medications, taking narcotics, gabapentin for postoperative pain. Yesterday his creatinine was 1.78, elevated over normal values and today his creatinine is 3.26 with a BUN of 64. Patient is being admitted to hospital for treatment of acute kidney injury which is likely secondary to dehydration from poor oral intake. On clinical exam he seems somewhat confused and sedated which may be secondary to the dehydration, but could be secondary to his medications causing a toxic effect. Will stop all these medications at this point, getting low dose oxycodone if necessary for pain and we'll see if his mental status clears over time. At this time there is no signs of any infectious etiology, but will monitor for this.     Principal Problem:    MILA (acute kidney injury) (H)    Assessment: likely due to dehydration secondary to poor oral intake secondary to sedation from medications.    Plan: IV fluid both been given period, continue IV fluids overnight. Recheck labs in the morning.  Active Problems:    Encephalopathy    Assessment: most likely due to dehydration although could be medication related activities on a number of sedating medications after surgery.    Plan: IV fluids, old sedating medications, follow    Diabetes mellitus, type 2 (H)    Assessment: early taking metformin, numbers up somewhat due to steroid use    Plan: hold metformin due to kidney status. Order   QID glucose checks with sliding scale coverage    Essential hypertension, benign    Assessment: low in the ED due to dehydration. Better numbers now after hydration    Plan: hold his blood pressure medicines for now    Stenosis of cervical spine with myelopathy    Assessment: status post cervical neck fusion and surgery on August 18. Appears have a lot of postoperative bleeding in the anterior chest and area of the neck.    Plan: for now follow, outpatient follow-up with neurosurgery  DVT Prophylaxis: Pneumatic Compression Devices  Code Status: Full Code    Disposition: Expected discharge in 2 days once feeling better, renal status improved.    Phil Chaudhari MD    Primary Care Physician   Evelio Luo    Chief Complaint   56-year-old male brought to the ER from the [facility], with increased sedation and potential dehydration.   History is obtained from the patient, electronic health record and emergency department physician    History of Present Illness   Don Redd is a 66 year old male who presents with  with increased sedation and potential dehydration. He is recovering thereafter a cervical neck fusion on August 18 at [facility]. This is done for cervical stenosis. Postoperatively he's had some hoarseness and has had a fair amount of bruising and swelling of his neck with bruising extending to his anterior chest wall. In the nursing home he is having a fair amount of pain and has been requiring oxycodone and is also taking gabapentine, Decadron, and muscle relaxers. With the past several days staff has noticed him to be more sedated with poor oral intake. Yesterday his creatinine was 1.78. He was encouraged drinking fluids and they cut back on his pain management. Today he seems more sedated and more dehydrated with a repeat creatinine elevated at 3.26. He normally has a normal creatinine. As I talk to him he has somewhat slurred speech, seems sleepy. He denies nausea or  vomiting. He said decreased urination. His diabetes currently taking metformin, which is on hold. They notice higher blood sugars at the nursing home. He denies fever, chills. He denies cough. He denies abdominal pain.       Past Medical History    I have reviewed this patient's medical history and updated it with pertinent information if needed.   Past Medical History:   Diagnosis Date     Diabetes type 2, controlled (H)      Hypertension      Ureteral calculi 4/2016       Past Surgical History   I have reviewed this patient's surgical history and updated it with pertinent information if needed.  Past Surgical History:   Procedure Laterality Date     AS LUMBAR SPINE FUSN,POST INTRBDY  2011     CYSTOSCOPY       FUSION CERVICAL ANTERIOR TWO LEVELS  2011    C5-7       Prior to Admission Medications   Prior to Admission Medications   Prescriptions Last Dose Informant Patient Reported? Taking?   ASPIRIN PO Past Month at Unknown time  Yes Yes   Sig: Take 81 mg by mouth daily   Cholecalciferol (VITAMIN D-3) 1000 UNITS CAPS 8/30/2017 at 0826  Yes Yes   Sig: Take 1,000 Units by mouth daily   DEXAMETHASONE PO 8/30/2017 at 0826  Yes Yes   Sig: Take 2 mg by mouth See Admin Instructions    DIAZEPAM PO 8/29/2017 at 0510  Yes Yes   Sig: Take 5 mg by mouth every 6 hours as needed for anxiety   FAMOTIDINE PO 8/30/2017 at 826  Yes Yes   Sig: Take 20 mg by mouth 2 times daily   GABAPENTIN PO 8/30/2017 at 0826  Yes Yes   Sig: Take 600 mg by mouth 2 times daily    HYDROXYZINE PAMOATE PO 8/29/2017 at 0829  Yes No   Sig: Take 25 mg by mouth At Bedtime   METHOCARBAMOL PO 8/30/2017 at 1210  Yes Yes   Sig: Take 750 mg by mouth 4 times daily   OXYCODONE HCL PO 8/30/2017 at 0826  Yes Yes   Sig: Take 10 mg by mouth every 4 hours as needed    Ondansetron HCl (ZOFRAN PO) 8/29/2017 at 1221  Yes Yes   Sig: Take 4 mg by mouth every 6 hours as needed for nausea or vomiting   SIMVASTATIN PO 8/29/2017 at 2029  Yes Yes   Sig: Take 40 mg by mouth At  Bedtime   ketoconazole (NIZORAL) 2 % shampoo 8/29/2017 at 1309  Yes Yes   Sig: Apply topically daily as needed for itching or irritation (also 2x weeklly)   lisinopril-hydrochlorothiazide (PRINZIDE/ZESTORETIC) 20-12.5 MG per tablet 8/30/2017 at 0826  Yes Yes   Sig: Take 2 tablets by mouth daily    metFORMIN (GLUCOPHAGE) 500 MG tablet 8/30/2017 at 0826  Yes Yes   Sig: Take 1,000 mg by mouth 2 times daily (with meals) Pt takes it in the AM and the evening.   polyethylene glycol (MIRALAX/GLYCOLAX) powder 8/30/2017 at 0826  Yes Yes   Sig: Take 17 g by mouth daily   senna-docusate (SENOKOT-S;PERICOLACE) 8.6-50 MG per tablet 8/30/2017 at 0826  Yes Yes   Sig: Take 2 tablets by mouth 2 times daily       Facility-Administered Medications: None     Allergies   No Known Allergies    Social History   I have reviewed this patient's social history and updated it with pertinent information if needed. Don Redd  reports that he has quit smoking. He does not have any smokeless tobacco history on file. He reports that he drinks alcohol. He reports that he does not use illicit drugs.    Family History   I have reviewed this patient's family history and updated it with pertinent information if needed.   Family History   Problem Relation Age of Onset     Coronary Artery Disease Mother      Breast Cancer Mother      Hypertension Mother      Other Cancer Father      lung cancer       Review of Systems   The 10 point Review of Systems is negative other than noted in the HPI or here.     Physical Exam   Temp: 97.4  F (36.3  C) Temp src: Oral BP: 123/75 Pulse: 76   Resp: 12 SpO2: 95 %      Vital Signs with Ranges  Temp:  [97.4  F (36.3  C)-97.9  F (36.6  C)] 97.4  F (36.3  C)  Pulse:  [71-76] 76  Resp:  [12-18] 12  BP: ()/(52-76) 123/75  SpO2:  [90 %-99 %] 95 %  169 lbs 0 oz    EXAM:  Constitutional: alert, mild distress and cooperative   Cardiovascular: PMI normal. No lifts, heaves, or thrills. RRR. No murmurs, clicks gallops or  rub  Respiratory: Percussion normal. Good diaphragmatic excursion. Lungs clear  Psychiatric: he seems sedated, although he is oriented to person place and time. Speech is somewhat slurred.  Head: Normocephalic. No masses, lesions, tenderness or abnormalities  Neck: neck has Steri-Strips across the anterior portion with a lot of bruising and swelling, this extends down to the anterior chest wall. It's minimally tender.  ENT: ears mouth and throat are normal, although his mouth is quite dry  Abdomen: Abdomen soft, non-tender. BS normal. No masses, organomegaly  NEURO: nonfocal, Reflexes normal and symmetric. Sensation grossly WNL.  SKIN: a lot of bruising over the neck and anterior chest wall  LYMPH: Normal cervical lymph nodes  JOINT/EXTREMITIES: extremities normal- no gross deformities noted and normal muscle tone     Data   Data reviewed today:  I personally reviewed no images or EKG's today.    Recent Labs  Lab 08/30/17  1255 08/29/17  1225   WBC 10.7 12.8*   HGB 13.8 13.5   MCV 81 81    322    135   POTASSIUM 4.2 4.2   CHLORIDE 90* 95   CO2 28 27   BUN 64* 41*   CR 3.26* 1.78*   ANIONGAP 15* 13   TOM 9.6 9.5   * 117*       No results found for this or any previous visit (from the past 24 hour(s)).

## 2017-08-31 NOTE — PROGRESS NOTES
S-(situation): Patient arrives to room 267 via cart from ED.    B-(background): 66 yr old here from [facility], s/p anterior/posterior cervical surgery with incisions to both anterior and posterior neck.  Here for MILA/dehydration.    A-(assessment): Patient seems confused at times, uncertain of his baseline cognitive status.  Is difficult to understand due to slurred/garbled speech (?etiology).  Was unable to void x2, after bladder scan was straight catheterized the first time and indwelling piper catheter inserted the 2nd time.  VSS, HR regular, lungs clear except for fine crackles auscultated in LLL.     R-(recommendations): Orders reviewed with patient. Will monitor patient per MD orders.     Inpatient nursing criteria listed below were met:    Health care directives status obtained and documented: Yes  Core Measures assessed (SSI): Yes  SCD's Documented: Yes  Vaccine assessment done and vaccines ordered if appropriate: Yes  Skin issues/needs documented:NA  Isolation needs addressed, if appropriate: NA  Fall Prevention: Care plan updated, Education given and documented Yes  MRSA swab completed for patient 55 years and older (exclude LUIS and TKA): Yes  My Chart patient sign up addressed and documented: No  Care Plan initiated: Yes  Education Assessment documented:Yes  Education Documented (Pre-existing chronic infection such as, MRSA/VRE need education on admission): Yes  New medication patient education completed and documented (Possible Side Effects of Common Medications handout): Yes  Home medications if not able to send immediately home with family stored here: NA   Reminder note placed in discharge instructions: NA  Discharge planning review completed (admission navigator) Yes

## 2017-08-31 NOTE — CONSULTS
"CARE TRANSITION SOCIAL WORK INITIAL ASSESSMENT:  Reason For Consult: discharge planning   Met with: Patient.    DATA  Principal Problem:    MILA (acute kidney injury) (H)  Active Problems:    Diabetes mellitus, type 2 (H)    Essential hypertension, benign    Stenosis of cervical spine with myelopathy    Encephalopathy          Primary Care MD Name: Evelio Luo  Contact information and PCP information verified: Yes      ASSESSMENT  Cognitive Status: awake, alert and oriented.             Lives With: facility resident  Living Arrangements: extended care facility  Quality Of Family Relationships: supportive, involved  Description of Support System: Supportive, Involved   Who is your support system?: Significant Other   Support Assessment: Adequate family and caregiver support   Insurance Concerns: No Insurance issues identified        This writer met with patient and introduced self and role. Discussed discharge planning.  Patient was admitted from [facility] and he states he plans to return there.  Discussed that patient does have a bed hold at [facility] that his insurance is paying for.  Also discussed transportation options to get back to [facility].  Patient stated, \"My ex-wife, Stella, can take me.\"  Also talked about the option of wheelchair van transport and that he has insurance that will cover this service.  Patient stated that he has used [employer] before.        PLAN    Patient plans to return to [facility] when discharged from the hospital.     Discharge Planner   Discharge Plans in progress: yes   Barriers to discharge plan: No   Follow up plan:  Care Transitions will continue to follow during patient's hospital stay.         Entered by: RADHA KRAFT 08/31/2017 4:03 PM               "

## 2017-09-01 VITALS
SYSTOLIC BLOOD PRESSURE: 177 MMHG | TEMPERATURE: 96.4 F | WEIGHT: 187.39 LBS | HEART RATE: 72 BPM | RESPIRATION RATE: 16 BRPM | DIASTOLIC BLOOD PRESSURE: 83 MMHG | OXYGEN SATURATION: 99 % | BODY MASS INDEX: 26.23 KG/M2 | HEIGHT: 71 IN

## 2017-09-01 LAB
ANION GAP SERPL CALCULATED.3IONS-SCNC: 9 MMOL/L (ref 3–14)
BACTERIA SPEC CULT: NORMAL
BUN SERPL-MCNC: 19 MG/DL (ref 7–30)
CALCIUM SERPL-MCNC: 8.6 MG/DL (ref 8.5–10.1)
CHLORIDE SERPL-SCNC: 100 MMOL/L (ref 94–109)
CO2 SERPL-SCNC: 28 MMOL/L (ref 20–32)
CREAT SERPL-MCNC: 0.76 MG/DL (ref 0.66–1.25)
GFR SERPL CREATININE-BSD FRML MDRD: >90 ML/MIN/1.7M2
GLUCOSE BLDC GLUCOMTR-MCNC: 121 MG/DL (ref 70–99)
GLUCOSE SERPL-MCNC: 117 MG/DL (ref 70–99)
POTASSIUM SERPL-SCNC: 4.2 MMOL/L (ref 3.4–5.3)
SODIUM SERPL-SCNC: 137 MMOL/L (ref 133–144)
SPECIMEN SOURCE: NORMAL

## 2017-09-01 PROCEDURE — 25000132 ZZH RX MED GY IP 250 OP 250 PS 637: Performed by: FAMILY MEDICINE

## 2017-09-01 PROCEDURE — 80048 BASIC METABOLIC PNL TOTAL CA: CPT | Performed by: FAMILY MEDICINE

## 2017-09-01 PROCEDURE — 25000128 H RX IP 250 OP 636: Performed by: FAMILY MEDICINE

## 2017-09-01 PROCEDURE — 90732 PPSV23 VACC 2 YRS+ SUBQ/IM: CPT | Performed by: FAMILY MEDICINE

## 2017-09-01 PROCEDURE — 99239 HOSP IP/OBS DSCHRG MGMT >30: CPT | Performed by: FAMILY MEDICINE

## 2017-09-01 PROCEDURE — 36415 COLL VENOUS BLD VENIPUNCTURE: CPT | Performed by: FAMILY MEDICINE

## 2017-09-01 PROCEDURE — 00000146 ZZHCL STATISTIC GLUCOSE BY METER IP

## 2017-09-01 RX ORDER — ACETAMINOPHEN 325 MG/1
650 TABLET ORAL EVERY 4 HOURS PRN
Qty: 100 TABLET | DISCHARGE
Start: 2017-09-01

## 2017-09-01 RX ORDER — LISINOPRIL AND HYDROCHLOROTHIAZIDE 12.5; 2 MG/1; MG/1
1 TABLET ORAL DAILY
Status: DISCONTINUED | OUTPATIENT
Start: 2017-09-01 | End: 2017-09-01 | Stop reason: HOSPADM

## 2017-09-01 RX ORDER — OXYCODONE HYDROCHLORIDE 5 MG/1
2.5 TABLET ORAL EVERY 4 HOURS PRN
Qty: 20 TABLET | Refills: 0 | Status: SHIPPED | OUTPATIENT
Start: 2017-09-01 | End: 2017-09-11

## 2017-09-01 RX ORDER — AMOXICILLIN 250 MG
1 CAPSULE ORAL 2 TIMES DAILY PRN
Qty: 100 TABLET | DISCHARGE
Start: 2017-09-01

## 2017-09-01 RX ADMIN — PNEUMOCOCCAL VACCINE POLYVALENT 0.5 ML
25; 25; 25; 25; 25; 25; 25; 25; 25; 25; 25; 25; 25; 25; 25; 25; 25; 25; 25; 25; 25; 25; 25 INJECTION, SOLUTION INTRAMUSCULAR; SUBCUTANEOUS at 11:25

## 2017-09-01 RX ADMIN — SODIUM CHLORIDE: 9 INJECTION, SOLUTION INTRAVENOUS at 01:08

## 2017-09-01 RX ADMIN — LISINOPRIL AND HYDROCHLOROTHIAZIDE 1 TABLET: 12.5; 2 TABLET ORAL at 11:25

## 2017-09-01 RX ADMIN — LISINOPRIL 10 MG: 10 TABLET ORAL at 08:09

## 2017-09-01 RX ADMIN — SENNOSIDES AND DOCUSATE SODIUM 2 TABLET: 8.6; 5 TABLET ORAL at 08:10

## 2017-09-01 RX ADMIN — SODIUM CHLORIDE: 9 INJECTION, SOLUTION INTRAVENOUS at 07:09

## 2017-09-01 RX ADMIN — POLYETHYLENE GLYCOL 3350 17 G: 17 POWDER, FOR SOLUTION ORAL at 08:10

## 2017-09-01 RX ADMIN — FAMOTIDINE 20 MG: 20 TABLET ORAL at 08:10

## 2017-09-01 RX ADMIN — ACETAMINOPHEN 650 MG: 325 TABLET ORAL at 08:10

## 2017-09-01 RX ADMIN — ACETAMINOPHEN 650 MG: 325 TABLET ORAL at 03:37

## 2017-09-01 ASSESSMENT — PAIN DESCRIPTION - DESCRIPTORS: DESCRIPTORS: ACHING

## 2017-09-01 NOTE — PROGRESS NOTES
"CLINICAL NUTRITION SERVICES  -  ASSESSMENT NOTE    REASON FOR ASSESSMENT  Don Redd is a 66 year old male seen by Registered Dietitian for Admission Nutrition Risk Screen - unintentional loss of 10 lbs or more in the past 2 months; reduced oral intake over the last month    NUTRITION HISTORY  - Information obtained from chart and pt  -Pt diagnosed with MILA (likely secondary to dehydration from poor PO intake)  -Noted presented to ED with increased dehydration over the past several days  -Hx of DM2  -Noted loose stools, confusion at times and little appetite; refused supper tray last night  -Spoke with patient, reports he did eat some breakfast this morning  -Reports appetite has been poor, only 25% of baseline for the past couple weeks  -Reports  lbs and stated he has lost some weight recently do to medications and poor appetite    CURRENT NUTRITION ORDERS  Diet Order:     Moderate Consistent Carbohydrate     Current Intake/Tolerance:  Eating 0% per flowsheets; pt stated poor appetite    PHYSICAL FINDINGS  Observed  -Bruising to neck area  Obtained from Chart/Interdisciplinary Team  -A lot of bruising over the neck and anterior chest wall    ANTHROPOMETRICS  Height: 5' 10.984\"  Weight: 187 lbs 6.26 oz  Body mass index is 26.15 kg/(m^2).  Weight Status:  Overweight BMI 25-29.9  IBW: 172 lbs  % IBW: 109%  Weight History:   Wt Readings from Last 10 Encounters:   08/30/17 85 kg (187 lb 6.3 oz)   08/30/17 82.1 kg (181 lb)   08/29/17 82.1 kg (181 lb)   08/25/17 85.9 kg (189 lb 6.4 oz)   08/08/17 85 kg (187 lb 6.4 oz)   08/07/17 85.2 kg (187 lb 12.8 oz)   05/23/17 81.6 kg (180 lb)   12/24/16 83.4 kg (183 lb 13.8 oz)   05/22/15 81.6 kg (180 lb)       LABS  Labs reviewed  Glucose (slighly elevated): Ranging  past 24 hours  GFR: 38 (L), >90 (improved)    MEDICATIONS  Medications reviewed    Dosing Weight 85 kg (CBW)    ASSESSED NUTRITION NEEDS PER APPROVED PRACTICE GUIDELINES:  Estimated Energy Needs: " 3336-3104 kcals (25-30 Kcal/Kg)  Justification: maintenance  Estimated Protein Needs: 68 grams protein (0.8 g pro/Kg)  Justification: maintenance and MILA  Estimated Fluid Needs: (1 mL/Kcal)  Justification: maintenance    MALNUTRITION:  % Weight Loss:  None noted  % Intake:  </= 50% for >/= 5 days (severe malnutrition)  Subcutaneous Fat Loss:  None observed  Muscle Loss:  None observed  Fluid Retention:  None noted    Malnutrition Diagnosis: Patient does not meet two of the above criteria necessary for diagnosing malnutrition    NUTRITION DIAGNOSIS:  Inadequate protein-energy intake related to decreased appetite as evidenced by intake < baseline for past 2 weeks, estimated nutrient intake < estimated nutrient needs and pt reports of weight loss.    NUTRITION INTERVENTIONS  Recommendations / Nutrition Prescription  -Recommend Ensure BID (chocolate)  -Continue to monitor and encourage PO intake    Implementation  Nutrition education: Provided education on importance of getting adequate nutrition. Encouraged patient to consume as much as he can of meals and Ensure.   Medical Food Supplement    Nutrition Goals  Intake of meals/snacks/supplements > 75% before discharge.     MONITORING AND EVALUATION:  Progress towards goals will be monitored and evaluated per protocol and Practice Guidelines    Radha Mccarty RD, LD  Clinical Dietitian  [phone]

## 2017-09-01 NOTE — DISCHARGE SUMMARY
[facility] Discharge Summary    Don Redd MRN# [id number]   Age: 66 year old YOB: 1950     Date of Admission:  8/30/2017  Date of Discharge::  9/1/2017  Admitting Physician:  Arnaldo Farr MD  Discharge Physician:  Cassi Miranda MD          Admission Diagnoses:   Dehydration [E86.0]  MILA (acute kidney injury) (H) [N17.9]          Discharge Diagnosis:   Principal Problem:    MILA (acute kidney injury) (H)    Assessment: Because secondary to significant dehydration caused by confusion which led to patient not eating or drinking anything. Confusion was felt secondary to his narcotic and muscle relaxant medications prescribed following surgery. Patient was hydrated with IV fluids and had progressive resolution of his acute kidney injury. Confusion resolved as narcotics and muscle relaxants were held and pain was well controlled on Tylenol alone. Patient is now eating and drinking without difficulty    Plan:  Patient will be discharged back to the rehabilitation facility for ongoing physical therapy with significant reduction in his pain regimen to hopefully avoid further confusion. He will continue to hydrate well going forward to avoid further kidney injury    Active Problems:    Stenosis of cervical spine with myelopathy    Assessment:  Status post surgical intervention, with patient undergoing physical rehabilitation at the nursing home facility    Plan:  Discharge back to rehabilitation facility for ongoing care. Pain is well controlled with Tylenol alone, however we'll provide oxycodone 2.5 mg every 4 hours as needed for breakthrough pain, which is significantly less than his previous regimen      Encephalopathy    Assessment:  Secondary to oversedation with medication, resolved as narcotics and muscle relaxants were held and patient is back to his baseline mentation    Plan:  Discharge with significantly reduced pain regimen as above      Diabetes mellitus, type 2  (H)    Assessment: On metformin with hemoglobin A1c 6.8. Metformin was held during time of acute kidney injury, however it was restarted as patient's renal function returned to baseline.    Plan:  Patient will continue with metformin b.i.d. dosing at time of discharge. Blood sugars have been elevated during this hospitalization, likely secondary to steroid administration, and we'll continue to check blood sugars twice a day as steroids are weaned off in the nursing home setting      Essential hypertension, benign    Assessment:  Patient blood pressure regimen was held secondary to his acute kidney injury and blood pressures have trended up into the 140-170 range during this hospitalization. Home regimen has been slowly added back in as renal function has improved    Plan:  Patient will discharge back to the rehabilitation facility without change to home regimen            Procedures:   No procedures performed during this admission          Medications Prior to Admission:     Prescriptions Prior to Admission   Medication Sig Dispense Refill Last Dose     Ondansetron HCl (ZOFRAN PO) Take 4 mg by mouth every 6 hours as needed for nausea or vomiting   8/29/2017 at 1221     Cholecalciferol (VITAMIN D-3) 1000 UNITS CAPS Take 1,000 Units by mouth daily   8/30/2017 at 0826     polyethylene glycol (MIRALAX/GLYCOLAX) powder Take 17 g by mouth daily   8/30/2017 at 0826     DEXAMETHASONE PO Take 2 mg by mouth See Admin Instructions    8/30/2017 at 0826     ketoconazole (NIZORAL) 2 % shampoo Apply topically daily as needed for itching or irritation (also 2x weeklly)   8/29/2017 at 1309     ASPIRIN PO Take 81 mg by mouth daily   Past Month at Unknown time     lisinopril-hydrochlorothiazide (PRINZIDE/ZESTORETIC) 20-12.5 MG per tablet Take 2 tablets by mouth daily  30 tablet  8/30/2017 at 0826     metFORMIN (GLUCOPHAGE) 500 MG tablet Take 1,000 mg by mouth 2 times daily (with meals) Pt takes it in the AM and the evening.    8/30/2017 at 0826     SIMVASTATIN PO Take 40 mg by mouth At Bedtime   8/29/2017 at 2029     [DISCONTINUED] DIAZEPAM PO Take 5 mg by mouth every 6 hours as needed for anxiety   8/29/2017 at 0510     [DISCONTINUED] FAMOTIDINE PO Take 20 mg by mouth 2 times daily   8/30/2017 at 826     [DISCONTINUED] HYDROXYZINE PAMOATE PO Take 25 mg by mouth At Bedtime   8/29/2017 at 0829     [DISCONTINUED] METHOCARBAMOL PO Take 750 mg by mouth 4 times daily   8/30/2017 at 1210     [DISCONTINUED] OXYCODONE HCL PO Take 10 mg by mouth every 4 hours as needed    8/30/2017 at 0826     [DISCONTINUED] GABAPENTIN PO Take 600 mg by mouth 2 times daily    8/30/2017 at 0826             Discharge Medications:     Current Discharge Medication List      START taking these medications    Details   acetaminophen (TYLENOL) 325 MG tablet Take 2 tablets (650 mg) by mouth every 4 hours as needed for mild pain  Qty: 100 tablet    Associated Diagnoses: Spinal stenosis of cervical region         CONTINUE these medications which have CHANGED    Details   oxyCODONE (ROXICODONE) 5 MG IR tablet Take 0.5 tablets (2.5 mg) by mouth every 4 hours as needed for moderate to severe pain  Qty: 20 tablet, Refills: 0    Associated Diagnoses: Spinal stenosis of cervical region      senna-docusate (SENOKOT-S;PERICOLACE) 8.6-50 MG per tablet Take 1 tablet by mouth 2 times daily as needed for constipation  Qty: 100 tablet    Associated Diagnoses: Constipation, unspecified constipation type         CONTINUE these medications which have NOT CHANGED    Details   Ondansetron HCl (ZOFRAN PO) Take 4 mg by mouth every 6 hours as needed for nausea or vomiting      Cholecalciferol (VITAMIN D-3) 1000 UNITS CAPS Take 1,000 Units by mouth daily      polyethylene glycol (MIRALAX/GLYCOLAX) powder Take 17 g by mouth daily      DEXAMETHASONE PO Take 2 mg by mouth See Admin Instructions       ketoconazole (NIZORAL) 2 % shampoo Apply topically daily as needed for itching or irritation (also  2x weeklly)      ASPIRIN PO Take 81 mg by mouth daily      lisinopril-hydrochlorothiazide (PRINZIDE/ZESTORETIC) 20-12.5 MG per tablet Take 2 tablets by mouth daily   Qty: 30 tablet      metFORMIN (GLUCOPHAGE) 500 MG tablet Take 1,000 mg by mouth 2 times daily (with meals) Pt takes it in the AM and the evening.      SIMVASTATIN PO Take 40 mg by mouth At Bedtime         STOP taking these medications       DIAZEPAM PO Comments:   Reason for Stopping:         FAMOTIDINE PO Comments:   Reason for Stopping:         HYDROXYZINE PAMOATE PO Comments:   Reason for Stopping:         METHOCARBAMOL PO Comments:   Reason for Stopping:         GABAPENTIN PO Comments:   Reason for Stopping:                     Consultations:   No consultations were requested during this admission          Brief History of Illness:   Patient is a 66-year-old gentleman who presented to the emergency room from [facility], where he had been recuperating after having a cervical neck fusion at [facility] on August 18 of this year. In the nursing home facility he was noted to have increased sedation and on lab work evaluation his creatinine was found to be 1.78 the day prior and patient was brought to the emergency room.  In the emergency room he was found to be very sedated and confused and his creatinine had worsened to 3.26. Decision was made to admit the patient for ongoing management of his acute kidney injury and encephalopathy          Hospital Course:   In the hospital patient was given aggressive IV fluid rehydration and had progressive improvement and final resolution of his acute kidney injury symptoms with creatinine returning to normal at 0.7.  Rock catheter was placed secondary to retention, but also secondary to narcotics. Patient's narcotics and muscle relaxants were all held initially and patient did have progressive clearing of his encephalopathy and confusion and returned to his baseline  mentation. Pain was well-controlled with Tylenol as needed and without any further muscle relaxant. Rock catheter was removed. Patient did have oxycodone p.r.n. at 2.5 which could be given, but did not require any doses during this hospital stay. He will discharge back to [facility] for ongoing therapy.         Physical Exam:   Vitals were reviewed  Constitutional:   awake, alert, cooperative, no apparent distress, and appears stated age     Lungs:   No increased work of breathing, good air exchange, clear to auscultation bilaterally, no crackles or wheezing     Cardiovascular:   Normal apical impulse, regular rate and rhythm, normal S1 and S2, no S3 or S4, and no murmur noted     Abdomen:    both sounds are present, abdomen is soft and nontender      Musculoskeletal:   no lower extremity pitting edema present     Neurologic:   Awake, alert, oriented to name, place and time.      Skin:   normal skin color, texture, turgor           Discharge Instructions and Follow-Up:   Discharge diet: Moderate consistent carbohydrate (1438-9665 nata / 4-6 CHO units per meal)   Discharge activity: Activity as tolerated with nursing assistance   Discharge follow-up: Follow up with nursing home provider as needed  Follow up with surgeon as scheduled           Discharge Disposition:   Discharged to [facility]      Attestation:  I have reviewed today's vital signs, notes, medications, labs and imaging.    More than 30 minutes was spent on this discharge.      Cassi Miranda MD    Note: Chart documentation done in part with Dragon Voice Recognition software. Although reviewed after completion, some word and grammatical errors may remain.

## 2017-09-01 NOTE — PROGRESS NOTES
SPIRITUAL HEALTH SERVICES  SPIRITUAL ASSESSMENT Progress Note  [facility]       introduced himself to Don Redd and informed him of his availability.    Tom Kolb M.Div., [facility]  Staff   Office tel: [phone]

## 2017-09-01 NOTE — PLAN OF CARE
Problem: Goal Outcome Summary  Goal: Goal Outcome Summary  Outcome: Improving  IV fluids running at 175 ml/hr. Rock in place with great urine output. States he has little appetite, and refused his supper tray. Tylenol given for headache. Offered caffeine products but patient refused.

## 2017-09-01 NOTE — PROGRESS NOTES
S-(situation): Patient discharged back to [facility] via [facility]- W/C.   B-(background): MILA- post surgery    A-(assessment):  Goals were met for D/C  R-(recommendations): Discharge instructions reviewed with pt. Listed belongings gathered and returned to patient. Family is aware of D/C. Report was given to [facility] staff         Discharge Nursing Criteria:     Care Plan and Patient education resolved: Yes    New Medications- pt has been educated about purpose and side effects: Yes    Vaccines  Pneumonia Vaccine verified at discharge: Yes  Influenza status verified at discharge:  Yes           MISCned to patient: NA  Medication Bin checked and emptied on discharge Yes  Patient reports post-discharge pain management plan is effective: Yes-

## 2017-09-01 NOTE — PHARMACY - DISCHARGE MEDICATION RECONCILIATION
Discharge medication review for this patient is complete. Pharmacist assisted with medication reconciliation of discharge medications with prior to admission medications.     The following changes were made to the discharge medication list based on pharmacist review:  none      Patient's Discharge Medication List  - medications as listed on After Visit Summary (AVS)     Review of your medicines        START taking         Dose / Directions      acetaminophen 325 MG tablet   Commonly known as:  TYLENOL   Used for:  Spinal stenosis of cervical region        Dose:  650 mg   Take 2 tablets (650 mg) by mouth every 4 hours as needed for mild pain   Quantity:  100 tablet   Refills:  0             CONTINUE these medicines which may have CHANGED, or have new prescriptions. If we are uncertain of the size of tablets/capsules you have at home, strength may be listed as something that might have changed.         Dose / Directions      oxyCODONE 5 MG IR tablet   Commonly known as:  ROXICODONE   This may have changed:    - medication strength  - how much to take  - reasons to take this   Used for:  Spinal stenosis of cervical region        Dose:  2.5 mg   Take 0.5 tablets (2.5 mg) by mouth every 4 hours as needed for moderate to severe pain   Quantity:  20 tablet   Refills:  0       senna-docusate 8.6-50 MG per tablet   Commonly known as:  SENOKOT-S;PERICOLACE   This may have changed:    - how much to take  - when to take this  - reasons to take this   Used for:  Constipation, unspecified constipation type        Dose:  1 tablet   Take 1 tablet by mouth 2 times daily as needed for constipation   Quantity:  100 tablet   Refills:  0             CONTINUE these medicines which have NOT CHANGED         Dose / Directions      ASPIRIN PO        Dose:  81 mg   Take 81 mg by mouth daily   Refills:  0       DEXAMETHASONE PO        Dose:  2 mg   Take 2 mg by mouth See Admin Instructions   Refills:  0       lisinopril-hydrochlorothiazide  20-12.5 MG per tablet   Commonly known as:  PRINZIDE/ZESTORETIC        Dose:  2 tablet   Take 2 tablets by mouth daily   Quantity:  30 tablet   Refills:  0       metFORMIN 500 MG tablet   Commonly known as:  GLUCOPHAGE        Dose:  1000 mg   Take 1,000 mg by mouth 2 times daily (with meals) Pt takes it in the AM and the evening.   Refills:  0       NIZORAL 2 % shampoo   Generic drug:  ketoconazole        Apply topically daily as needed for itching or irritation (also 2x weeklly)   Refills:  0       polyethylene glycol powder   Commonly known as:  MIRALAX/GLYCOLAX        Dose:  17 g   Take 17 g by mouth daily   Refills:  0       SIMVASTATIN PO        Dose:  40 mg   Take 40 mg by mouth At Bedtime   Refills:  0       Vitamin D-3 1000 UNITS Caps        Dose:  1000 Units   Take 1,000 Units by mouth daily   Refills:  0       ZOFRAN PO        Dose:  4 mg   Take 4 mg by mouth every 6 hours as needed for nausea or vomiting   Refills:  0             STOP taking              DIAZEPAM PO           FAMOTIDINE PO           GABAPENTIN PO           HYDROXYZINE PAMOATE PO           METHOCARBAMOL PO                    Where to get your medicines        Some of these will need a paper prescription and others can be bought over the counter. Ask your nurse if you have questions.       Bring a paper prescription for each of these medications      oxyCODONE 5 MG IR tablet       You don't need a prescription for these medications      acetaminophen 325 MG tablet     senna-docusate 8.6-50 MG per tablet

## 2017-09-01 NOTE — PLAN OF CARE
Problem: Goal Outcome Summary  Goal: Goal Outcome Summary  S. End of shift report.    KYLAH NOEL. Patient AOX4, /92 HR 80's afebrile and on RA. Tylenol given for pain with relief. patient up to the BR with walker gait belt and 1 assist. Ice for neck. Bruising on neck, chest and left eye and forehead. UOP 1300 cc's+.    R. Will continue to monitor per POC

## 2017-09-01 NOTE — PLAN OF CARE
Name: Don Redd    MRN#: [id number]    Reason for Hospitalization: Dehydration [E86.0]  MILA (acute kidney injury) (H) [N17.9]    Discharge Date: 9/1/2017    Patient / Family response to discharge plan: Patient in agreement with d/c back to [facility] for today.  Patient wanting to return as soon as possible today.  Patient wanted to try to find his own ride back, but was unable to find anyone available.  Patient then requested [employer]. Patient has insurance coverage through his [facility] for transportation.  Writer called the insurance and verified a ride with [employer] for 1150 today.  Writer had notified [facility] of the d/c time.     Follow-Up Appt: No future appointments.    Other Providers (Care Coordinator, County Services, PCA services etc): No    Discharge Disposition: transitional care unit    RADHA KRAFT

## 2017-09-05 ENCOUNTER — NURSING HOME VISIT (OUTPATIENT)
Dept: GERIATRICS | Facility: CLINIC | Age: 67
End: 2017-09-05
Payer: COMMERCIAL

## 2017-09-05 VITALS
TEMPERATURE: 99.1 F | WEIGHT: 173.6 LBS | DIASTOLIC BLOOD PRESSURE: 71 MMHG | BODY MASS INDEX: 24.22 KG/M2 | HEART RATE: 87 BPM | OXYGEN SATURATION: 92 % | RESPIRATION RATE: 16 BRPM | SYSTOLIC BLOOD PRESSURE: 123 MMHG

## 2017-09-05 DIAGNOSIS — M48.02 SPINAL STENOSIS OF CERVICAL REGION: Primary | ICD-10-CM

## 2017-09-05 DIAGNOSIS — K59.03 DRUG-INDUCED CONSTIPATION: ICD-10-CM

## 2017-09-05 DIAGNOSIS — I10 ESSENTIAL HYPERTENSION, BENIGN: ICD-10-CM

## 2017-09-05 DIAGNOSIS — E11.42 TYPE 2 DIABETES MELLITUS WITH DIABETIC POLYNEUROPATHY, WITHOUT LONG-TERM CURRENT USE OF INSULIN (H): ICD-10-CM

## 2017-09-05 DIAGNOSIS — N17.9 AKI (ACUTE KIDNEY INJURY) (H): ICD-10-CM

## 2017-09-05 DIAGNOSIS — E78.5 HYPERLIPIDEMIA LDL GOAL <100: ICD-10-CM

## 2017-09-05 PROBLEM — Z98.890 S/P CERVICAL DISCECTOMY: Status: ACTIVE | Noted: 2017-09-05

## 2017-09-05 PROCEDURE — 99309 SBSQ NF CARE MODERATE MDM 30: CPT | Performed by: NURSE PRACTITIONER

## 2017-09-05 NOTE — MR AVS SNAPSHOT
"              After Visit Summary   9/5/2017    Don Redd    MRN: [id number]           Patient Information     Date Of Birth          1950        Visit Information        Provider Department      9/5/2017 12:30 PM Hyacinth Perez APRN CNP Geriatric[facility] Transitional [facility]        Today's Diagnoses     Spinal stenosis of cervical region    -  1    Essential hypertension, benign        MILA (acute kidney injury) (H)        Type 2 diabetes mellitus with diabetic polyneuropathy, without long-term current use of insulin (H)        Drug-induced constipation        Hyperlipidemia LDL goal <100           Follow-ups after your visit        Who to contact     If you have questions or need follow up information about today's clinic visit or your schedule please contact G[facility]AT[facility] directly at [phone].  Normal or non-critical lab and imaging results will be communicated to you by Bulbstormhart, letter or phone within 4 business days after the clinic has received the results. If you do not hear from us within 7 days, please contact the clinic through Bulbstormhart or phone. If you have a critical or abnormal lab result, we will notify you by phone as soon as possible.  Submit refill requests through Epiclist or call your pharmacy and they will forward the refill request to us. Please allow 3 business days for your refill to be completed.          Additional Information About Your Visit        Bulbstormhart Information     Epiclist lets you send messages to your doctor, view your test results, renew your prescriptions, schedule appointments and more. To sign up, go to [email or web address] . Click on \"Log in\" on the left side of the screen, which will take you to the Welcome page. Then click on \"Sign up Now\" on the right side of the page.     You will be asked to enter the access code listed below, as well as some personal information. Please follow the directions to create your username and password.     Your " access code is: [email or web address]  Expires: 2017  3:00 PM     Your access code will  in 90 days. If you need help or a new code, please call your [facility] or [phone].        Care EveryWhere ID     This is your Care EveryWhere ID. This could be used by other organizations to access your [place] medical records  [id number]        Your Vitals Were     Pulse Temperature Respirations Pulse Oximetry BMI (Body Mass Index)       87 99.1  F (37.3  C) 16 92% 24.22 kg/m2        Blood Pressure from Last 3 Encounters:   17 108/68   17 123/71   17 177/83    Weight from Last 3 Encounters:   17 173 lb 9.6 oz (78.7 kg)   17 173 lb 9.6 oz (78.7 kg)   17 187 lb 6.3 oz (85 kg)              Today, you had the following     No orders found for display       Primary Care Provider Office Phone # Fax #    Evelio B Yunadela [phone]       [facility] [address]  [facility] [address]        Equal Access to Services     JOSHUA GUTHRIE AH: Hadii yossi messinao Sodonisali, waaxda luqadaha, qaybta kaalmada adeegyada, dinorah patel. So [facility] [phone].    ATENCIÓN: Si habla español, tiene a blanco disposición servicios gratuitos de asistencia lingüística. Llame al [phone].    We comply with applicable federal civil rights laws and [place] laws. We do not discriminate on the basis of race, color, national origin, age, disability sex, sexual orientation or gender identity.            Thank you!     Thank you for choosing G[facility]AT[facility]  for your care. Our goal is always to provide you with excellent care. Hearing back from our patients is one way we can continue to improve our services. Please take a few minutes to complete the written survey that you may receive in the mail after your visit with us. Thank you!             Your Updated Medication List - Protect others around you: Learn how to safely use, store and throw away  your medicines at www.disposemymeds.org.          This list is accurate as of: 9/5/17 11:59 PM.  Always use your most recent med list.                   Brand Name Dispense Instructions for use Diagnosis    acetaminophen 325 MG tablet    TYLENOL    100 tablet    Take 2 tablets (650 mg) by mouth every 4 hours as needed for mild pain    Spinal stenosis of cervical region       ASPIRIN PO      Take 81 mg by mouth daily        lisinopril-hydrochlorothiazide 20-12.5 MG per tablet    PRINZIDE/ZESTORETIC    30 tablet    Take 2 tablets by mouth daily        MELATONIN PO      Take 3 mg by mouth At Bedtime        metFORMIN 500 MG tablet    GLUCOPHAGE     Take 1,000 mg by mouth 2 times daily (with meals) Pt takes it in the AM and the evening.        NIZORAL 2 % shampoo   Generic drug:  ketoconazole      Apply topically daily as needed for itching or irritation (also 2x weeklly)        oxyCODONE 5 MG IR tablet    ROXICODONE    20 tablet    Take 0.5 tablets (2.5 mg) by mouth every 4 hours as needed for moderate to severe pain    Spinal stenosis of cervical region       senna-docusate 8.6-50 MG per tablet    SENOKOT-S;PERICOLACE    100 tablet    Take 1 tablet by mouth 2 times daily as needed for constipation    Constipation, unspecified constipation type       SIMVASTATIN PO      Take 40 mg by mouth At Bedtime        Vitamin D-3 1000 UNITS Caps      Take 1,000 Units by mouth daily

## 2017-09-05 NOTE — PROGRESS NOTES
"[place] [facility]  PRIMARY CARE PROVIDER AND CLINIC:  Evelio Luo [facility] [address] / [facility] [address]  Chief Complaint   Patient presents with     Hospital F/U       HPI:    Don Redd is a 66 year old  (1950),admitted to the [facility]  from [facility].  Hospital stay 8/30/17 through 9/1/17.  Admitted to this facility for  rehab, medical management and nursing care.  HPI information obtained from: facility chart records, facility staff, patient report and [facility] chart review.  Current issues are:        1. Spinal stenosis of cervical region - seeing Don today as he is back after having a short hospital due to MILA as well as sedation from narcotics.  Don originally came for rehab after having a discectomy and decompression of vertebrae  (Status post anterior cervical diskectomy, decompression, and fusion extending from C5 to C6 to C7)  Now Don comes back with oxycodone 2.5mg PRN.  When he originally came he had a range order of 5-15mg and wanted the 15mg around the clock due to the pain he was experiencing.  Did not do around the clock but agreed to 15mg 5x day scheduled.  Seemed to agree with him and then turned with sedation, vomiting and MILA.  Today Don states he feels much better.  Does not want oxycodone anymore but stated \"I know I wanted that much\"     2. Essential hypertension, benign - blood pressures range from 100-140's/60-70's.  Occasional outlier   3. MILA (acute kidney injury) (H) - will need to monitor while here    4. Type 2 diabetes mellitus with diabetic polyneuropathy, without long-term current use of insulin (H) - remains on Metformin and blood sugar checks.   5. Drug-induced constipation - Don requesting to cut down on bowel medications   6. Hyperlipidemia LDL goal <100        CODE STATUS/ADVANCE DIRECTIVES DISCUSSION:   CPR/Full code   Patient's living condition: lives " with spouse    ALLERGIES:Review of patient's allergies indicates no known allergies.  PAST MEDICAL HISTORY:  has a past medical history of Diabetes type 2, controlled (H); Hypertension; and Ureteral calculi (4/2016).  PAST SURGICAL HISTORY:  has a past surgical history that includes LUMBAR SPINE FUSN,POST INTRBDY (2011); Fusion cervical anterior two levels (2011); and cystoscopy.  FAMILY HISTORY: family history includes Breast Cancer in his mother; Coronary Artery Disease in his mother; Hypertension in his mother; Other Cancer in his father.  SOCIAL HISTORY:  reports that he has quit smoking. He does not have any smokeless tobacco history on file. He reports that he drinks alcohol. He reports that he does not use illicit drugs.    Post Discharge Medication Reconciliation Status: discharge medications reconciled, continue medications without change.  Current Outpatient Prescriptions   Medication Sig Dispense Refill     MELATONIN PO Take 3 mg by mouth At Bedtime       oxyCODONE (ROXICODONE) 5 MG IR tablet Take 0.5 tablets (2.5 mg) by mouth every 4 hours as needed for moderate to severe pain 20 tablet 0     acetaminophen (TYLENOL) 325 MG tablet Take 2 tablets (650 mg) by mouth every 4 hours as needed for mild pain 100 tablet      senna-docusate (SENOKOT-S;PERICOLACE) 8.6-50 MG per tablet Take 1 tablet by mouth 2 times daily as needed for constipation 100 tablet      Cholecalciferol (VITAMIN D-3) 1000 UNITS CAPS Take 1,000 Units by mouth daily       ketoconazole (NIZORAL) 2 % shampoo Apply topically daily as needed for itching or irritation (also 2x weeklly)       ASPIRIN PO Take 81 mg by mouth daily       lisinopril-hydrochlorothiazide (PRINZIDE/ZESTORETIC) 20-12.5 MG per tablet Take 2 tablets by mouth daily  30 tablet      metFORMIN (GLUCOPHAGE) 500 MG tablet Take 1,000 mg by mouth 2 times daily (with meals) Pt takes it in the AM and the evening.       SIMVASTATIN PO Take 40 mg by mouth At Bedtime         ROS:  10  point ROS of systems including Constitutional, Eyes, Respiratory, Cardiovascular, Gastroenterology, Genitourinary, Integumentary, Muscularskeletal, Psychiatric were all negative except for pertinent positives noted in my HPI.    Exam:  /71  Pulse 87  Temp 99.1  F (37.3  C)  Resp 16  Wt 173 lb 9.6 oz (78.7 kg)  SpO2 92%  BMI 24.22 kg/m2  GENERAL APPEARANCE:  Alert, oriented, cooperative, appears more alert than ever  EYES:  EOM, conjunctivae, lids, pupils and irises normal, PERRL  NECK:  neck has healing bruising.  incision on back of neck is clean and dry.  RESP:  respiratory effort and palpation of chest normal, lungs clear to auscultation , no respiratory distress  CV:  Palpation and auscultation of heart done , regular rate and rhythm, no murmur, rub, or gallop, no edema  ABDOMEN:  normal bowel sounds, soft, nontender, no hepatosplenomegaly or other masses, no guarding or rebound  M/S:   Gait and station abnormal attending therapies.  uses a walker with PT.  propels own w/c.  SKIN:  resolving bruises on left eye and front of neck/jaw line  PSYCH:  oriented X 3, normal insight, judgement and memory, affect and mood normal    Lab/Diagnostic data:     CBC RESULTS:   Recent Labs   Lab Test  08/31/17   0531  08/30/17   1255   WBC  7.0  10.7   RBC  4.29*  4.99   HGB  12.1*  13.8   HCT  35.1*  40.6   MCV  82  81   MCH  28.2  27.7   MCHC  34.5  34.0   RDW  13.7  14.1   PLT  220  312       Last Basic Metabolic Panel:  Recent Labs   Lab Test  09/01/17   0553  08/31/17   0531   NA  137  139   POTASSIUM  4.2  4.0   CHLORIDE  100  101   TOM  8.6  8.5   CO2  28  28   BUN  19  46*   CR  0.76  1.79*   GLC  117*  81       Liver Function Studies -   Recent Labs   Lab Test  12/24/16   1555  12/24/16   0220   PROTTOTAL  6.3*  6.8   ALBUMIN  3.4  3.7   BILITOTAL  0.4  0.4   ALKPHOS  61  61   AST  17  17   ALT  24  25       No results found for: TSH]    Lab Results   Component Value Date    A1C 6.8 08/31/2017        ASSESSMENT/PLAN:  Spinal stenosis of cervical region  Pain is controled now with PRN Tylenol.  Staying away from the oxycodone but knows it is a lower dose and available.    Attending therapies with goal of returning home.    Incisions clean and dry.    Surgeon is through [facility] and so follow up with them.    Essential hypertension, benign  Blood pressures good.  Vitals daily.  Remains on Lisinopril / HCTZ 20/12.5 2 tabs daily.      MILA (acute kidney injury) (H)  Will order CBC and BMP for 9/11/17 for monitoring progress.  Eating and drinking at this time     Type 2 diabetes mellitus with diabetic polyneuropathy, without long-term current use of insulin (H)  Blood sugars twice a day and range from 90's to low 200's.  Moderate consistent carb diet.  Metformin 1000mg BID    Drug-induced constipation  Currently on Miralax daily and has PRN Rocio Ochoa asking for bowel medication to be discontinued and so will do this.  1.  Discontinue Miralax.  Bowels monitored daily.    Hyperlipidemia LDL goal <100  Will remain on simvastatin 40mg in PM.         Electronically signed by:  MIKE Fontenot CNP

## 2017-09-11 ENCOUNTER — HOSPITAL LABORATORY (OUTPATIENT)
Dept: NURSING HOME | Facility: OTHER | Age: 67
End: 2017-09-11

## 2017-09-11 ENCOUNTER — DISCHARGE SUMMARY NURSING HOME (OUTPATIENT)
Dept: GERIATRICS | Facility: CLINIC | Age: 67
End: 2017-09-11
Payer: COMMERCIAL

## 2017-09-11 VITALS
BODY MASS INDEX: 24.22 KG/M2 | HEART RATE: 97 BPM | DIASTOLIC BLOOD PRESSURE: 68 MMHG | OXYGEN SATURATION: 98 % | TEMPERATURE: 98.9 F | WEIGHT: 173.6 LBS | RESPIRATION RATE: 20 BRPM | SYSTOLIC BLOOD PRESSURE: 108 MMHG

## 2017-09-11 DIAGNOSIS — M54.2 NECK PAIN: ICD-10-CM

## 2017-09-11 DIAGNOSIS — E11.42 TYPE 2 DIABETES MELLITUS WITH DIABETIC POLYNEUROPATHY, WITHOUT LONG-TERM CURRENT USE OF INSULIN (H): ICD-10-CM

## 2017-09-11 DIAGNOSIS — E78.5 DYSLIPIDEMIA: ICD-10-CM

## 2017-09-11 DIAGNOSIS — M48.02 SPINAL STENOSIS OF CERVICAL REGION: Primary | ICD-10-CM

## 2017-09-11 DIAGNOSIS — N17.9 AKI (ACUTE KIDNEY INJURY) (H): ICD-10-CM

## 2017-09-11 DIAGNOSIS — Z98.890 S/P CERVICAL DISCECTOMY: ICD-10-CM

## 2017-09-11 DIAGNOSIS — E86.0 DEHYDRATION: ICD-10-CM

## 2017-09-11 DIAGNOSIS — I10 ESSENTIAL HYPERTENSION, BENIGN: ICD-10-CM

## 2017-09-11 LAB
ANION GAP SERPL CALCULATED.3IONS-SCNC: 9 MMOL/L (ref 3–14)
BUN SERPL-MCNC: 17 MG/DL (ref 7–30)
CALCIUM SERPL-MCNC: 9.2 MG/DL (ref 8.5–10.1)
CHLORIDE SERPL-SCNC: 97 MMOL/L (ref 94–109)
CO2 SERPL-SCNC: 32 MMOL/L (ref 20–32)
CREAT SERPL-MCNC: 0.94 MG/DL (ref 0.66–1.25)
ERYTHROCYTE [DISTWIDTH] IN BLOOD BY AUTOMATED COUNT: 14.1 % (ref 10–15)
GFR SERPL CREATININE-BSD FRML MDRD: 80 ML/MIN/1.7M2
GLUCOSE SERPL-MCNC: 97 MG/DL (ref 70–99)
HCT VFR BLD AUTO: 33.6 % (ref 40–53)
HGB BLD-MCNC: 11.7 G/DL (ref 13.3–17.7)
MCH RBC QN AUTO: 28.4 PG (ref 26.5–33)
MCHC RBC AUTO-ENTMCNC: 34.8 G/DL (ref 31.5–36.5)
MCV RBC AUTO: 82 FL (ref 78–100)
PLATELET # BLD AUTO: 351 10E9/L (ref 150–450)
POTASSIUM SERPL-SCNC: 3.7 MMOL/L (ref 3.4–5.3)
RBC # BLD AUTO: 4.12 10E12/L (ref 4.4–5.9)
SODIUM SERPL-SCNC: 138 MMOL/L (ref 133–144)
WBC # BLD AUTO: 6.3 10E9/L (ref 4–11)

## 2017-09-11 PROCEDURE — 99316 NF DSCHRG MGMT 30 MIN+: CPT | Performed by: NURSE PRACTITIONER

## 2017-09-11 RX ORDER — OXYCODONE AND ACETAMINOPHEN 10; 325 MG/1; MG/1
1 TABLET ORAL EVERY 4 HOURS PRN
COMMUNITY

## 2017-09-11 RX ORDER — OXYCODONE AND ACETAMINOPHEN 2.5; 325 MG/1; MG/1
2.5 TABLET ORAL EVERY 4 HOURS PRN
COMMUNITY

## 2017-09-11 NOTE — PROGRESS NOTES
Documentation of Face-to-Face and Certification for Home Health Services     Patient: Don Redd   YOB: 1950  MR Number: [id number]  Today's Date: 9/11/2017    I certify that patient: Don Redd is under my care and that I, or a nurse practitioner or physician's assistant working with me, had a face-to-face encounter that meets the physician face-to-face encounter requirements with this patient on: 9/11/2017.    This encounter with the patient was in whole, or in part, for the following medical condition, which is the primary reason for home health care: cervical spinal stenosis, s/p diskectomy, neck pain, dehydration, htn.    I certify that, based on my findings, the following services are medically necessary home health services: Nursing, Occupational Therapy and Physical Therapy.    My clinical findings support the need for the above services because: Nurse is needed: To assess vitals and med management after changes in medications or other medical regimen.., Occupational Therapy Services are needed to assess and treat cognitive ability and address ADL safety due to impairment in ROM, upper body strength. and Physical Therapy Services are needed to assess and treat the following functional impairments: ROM, ambulation, transfers balance, neuro re-ed.    Further, I certify that my clinical findings support that this patient is homebound (i.e. absences from home require considerable and taxing effort and are for medical reasons or [employer] services or infrequently or of short duration when for other reasons) because: Requires assistance of another person or specialized equipment to access medical services because patient: Range of motion limitations prevents ability to exit home safely. and Requires supervision of another for safe transfer...    Based on the above findings. I certify that this patient is confined to the home and needs intermittent skilled nursing care, physical therapy  and/or speech therapy.  The patient is under my care, and I have initiated the establishment of the plan of care.  This patient will be followed by a physician who will periodically review the plan of care.  Physician/Provider to provide follow up care: Evelio Luo    Responsible Medicare certified PECOS Physician: Shlomo Alva MD  Physician Signature: See electronic signature associated with these discharge orders.  Date: 9/11/2017

## 2017-09-11 NOTE — PROGRESS NOTES
[facility] DISCHARGE SUMMARY    PATIENT'S NAME: Don Redd  YOB: 1950  MEDICAL RECORD NUMBER:  [id number]    PRIMARY CARE PROVIDER AND CLINIC RESPONSIBLE AFTER TRANSFER: Evelio Luo [facility] [address] / [facility] [address]     CODE STATUS/ADVANCE DIRECTIVES DISCUSSION:   CPR/Full code      No Known Allergies    TRANSFERRING PROVIDERS: MIKE Fontenot CNP, Shlomo Alva MD  DATE OF SNF ADMISSION:  September / 01 / 2017  DATE OF SNF (anticipated) DISCHARGE: September / 12 / 2017  DISCHARGE DISPOSITION: Non-[facility] Provider   Nursing Facility: [facility] stay 8/30/17 to 9/1/17.     Condition on Discharge:  Improving.  Function:  Ambulates with assist of 1 and walker  Cognitive Scores: BIMS 9/7/17 = 15/15    Equipment: walker    DISCHARGE DIAGNOSIS:   1. Spinal stenosis of cervical region    2. S/P cervical discectomy    3. Neck pain    4. Dehydration    5. MILA (acute kidney injury) (H)    6. Essential hypertension, benign    7. Type 2 diabetes mellitus with diabetic polyneuropathy, without long-term current use of insulin (H)    8. Dyslipidemia        HPI Nursing Facility Course:  HPI information obtained from: facility chart records, facility staff, patient report and [facility] chart review.  Spinal stenosis of cervical region  - HOME CARE NURSING REFERRAL  S/P cervical discectomy  - HOME CARE NURSING REFERRAL  - Don originally came from [facility] where he underwent the following:  PROCEDURES PERFORMED DURING HOSPITALIZATION:   1. Extended cervical decompressive laminectomy encompassing the following levels, C3, C4 and C5 (3 levels).   2. Partial removal and revision of anterior cervical plate extending from C5 to C6 to C7.   3. Anterior cervical diskectomy, decompression, and fusion with cage and plating placement at C3-C4 and C4-C5 (2 levels).    Don  was in so much pain that it was hard for him to be motivated to do therapies.  Negotiated with him pain medication administration and he wanted the highest amount ordered and around the clock.  Did agree on 15mg of oxycodone but not around the clock.  Ordered it 5x during the day and then could still have his PRN.  Unfortunately he appeared to tolerate the medication until one day he was vomiting and not himself.  Pushed fluids after giving Zofran and seeing his kidney function on the boarder line.  The next day repeated labs and values doubled.  Don opted to go to the ED for evaluation and so had a short stay at [facility].  Came back with small dose of oxycodone.  Has done very well with therapies.  Ambulating with assist of one and walker.      Neck pain  Don today asked that the oxycodone be thrown out and Percocet be ordered.  Tylenol was not helping and in a quiet voice said it did not agree with his stomach.  1.  Discontinue oxycodone 2.5mg every 4 hours prn  2.  Percocet 2.5/325mg 1 tab every 4 hours prn - script given for 60 tabs.    - HOME CARE NURSING REFERRAL    Dehydration  Labs done today looked great.  Appetite is fair.  Drinking sufficient amounts.    - HOME CARE NURSING REFERRAL    MILA (acute kidney injury) (H)  See lab values below.  His kidney function went from normal to almost 4 in Cr.  [place] this was from too much medication in which Don does not want that to happen again.      Essential hypertension, benign  Blood pressures range from 100-120's/70's.  Continues with lisinopril/HCTZ 20/12.5  2 tabs daily.    - HOME CARE NURSING REFERRAL    Type 2 diabetes mellitus with diabetic polyneuropathy, without long-term current use of insulin (H)  Blood sugars ranged 7am  =  and 5pm = .    Metformin 1000mg twice a day.    Dyslipidemia  Remains on simvastatin 40mg in PM.  No labs done while here.      PAST MEDICAL HISTORY:  has a past medical history of Diabetes type  2, controlled (H); Hypertension; and Ureteral calculi (4/2016).    DISCHARGE MEDICATIONS:  Current Outpatient Prescriptions   Medication Sig Dispense Refill     MELATONIN PO Take 3 mg by mouth At Bedtime       oxyCODONE (ROXICODONE) 5 MG IR tablet Take 0.5 tablets (2.5 mg) by mouth every 4 hours as needed for moderate to severe pain 20 tablet 0     acetaminophen (TYLENOL) 325 MG tablet Take 2 tablets (650 mg) by mouth every 4 hours as needed for mild pain 100 tablet      senna-docusate (SENOKOT-S;PERICOLACE) 8.6-50 MG per tablet Take 1 tablet by mouth 2 times daily as needed for constipation 100 tablet      Cholecalciferol (VITAMIN D-3) 1000 UNITS CAPS Take 1,000 Units by mouth daily       ketoconazole (NIZORAL) 2 % shampoo Apply topically daily as needed for itching or irritation (also 2x weeklly)       ASPIRIN PO Take 81 mg by mouth daily       lisinopril-hydrochlorothiazide (PRINZIDE/ZESTORETIC) 20-12.5 MG per tablet Take 2 tablets by mouth daily  30 tablet      metFORMIN (GLUCOPHAGE) 500 MG tablet Take 1,000 mg by mouth 2 times daily (with meals) Pt takes it in the AM and the evening.       SIMVASTATIN PO Take 40 mg by mouth At Bedtime         MEDICATION CHANGES/RATIONALE:   8/24/17  Ok to change metformin to every AM and PM.  8/25/17  PT eval and treat for therapeutic exercise, gait, neuro re-ed, functional activity  8/25/17  OT eval and treat for ADLs, therapeutic exercise and functional mobility  8/25/17  Discontinue oxycodone orders.  Start oxycodone 15mg 5x day and 10mg every 4 hours prn, senna-S 2 tabs BID  8/29/17  Zofran 4mg every 6 hours prn for N/V  8/29/17  CBC and BMP today due to sedation.  Lower oxycodone to 10mg 4x day and 10mg every 4 hours prn.  8/30/17  CBC and BMP today due to lethargy, vomiting  8/30/17  Send to the ED for evaluation of sedation and kidney function  9/1/17  OT eval and treat for ADLS, therapeutic exercise and functional mobility  9/1/17  PT eval and treat for gait,  therapeutic exercise, functional activity and neuro re-ed  9/5/17  Start sugar free supplement 4oz BID between meals  9/5/17  Melatonin 3mg at HS for sleep disturbances, discontinue Miralax by request.  BMP and CBC on 9/11/17, discontinue Zofran  9/8/17  Ok to discharge home with medications.  [facility]:  RN for monitoring vitals, pain control, and medication management.  PT/OT eval and treat for balance, ambulation. And HHA for bathing    Controlled medications sent with patient: facility may fill the script of Percocet 2.5/325 1 tab every 4 hours prn and send 60 or less tabs home with him     ROS:    4 point ROS including Respiratory, CV, GI and , other than that noted in the HPI,  is negative    Physical Exam:   Vitals: /68  Pulse 97  Temp 98.9  F (37.2  C)  Resp 20  Wt 173 lb 9.6 oz (78.7 kg)  SpO2 98%  BMI 24.22 kg/m2  BMI= Body mass index is 24.22 kg/(m^2).    GENERAL APPEARANCE:  Alert, in no distress, appears healthy, oriented, cooperative  EYES:  bruising that is resolving on left eyelids.  wears glasses  NECK:  bruising resolving.  incisions intact.  no drainage  RESP:  respiratory effort and palpation of chest normal, lungs clear to auscultation , no respiratory distress  CV:  Palpation and auscultation of heart done , regular rate and rhythm, no murmur, rub, or gallop, no edema  ABDOMEN:  normal bowel sounds, soft, nontender, no hepatosplenomegaly or other masses, no guarding or rebound  M/S:   Gait and station abnormal ambulating with a walker.  transfer with assist of one.  using an ice pack on neck for comfort  SKIN:  incisions on neck are healing and intact.  bruising on left eyelid.  skin is cool and dry to touch  PSYCH:  oriented X 3, normal insight, judgement and memory, affect and mood normal    DISCHARGE PLAN:  Occupational Therapy, Physical Therapy, Registered Nurse, Home Health Aide and From:  [facility]  Patient instructed to follow-up with:  PCP in 7-10 days        Current [place] scheduled appointments:  Future Appointments  Date Time Provider Department Center   9/11/2017 12:00 PM Hyacinth Perez APRN CNP [facility] referral needed and placed by this provider: No    Pending labs: none    SNF labs   Results for orders placed or performed in visit on 09/11/17   Basic metabolic panel   Result Value Ref Range    Sodium 138 133 - 144 mmol/L    Potassium 3.7 3.4 - 5.3 mmol/L    Chloride 97 94 - 109 mmol/L    Carbon Dioxide 32 20 - 32 mmol/L    Anion Gap 9 3 - 14 mmol/L    Glucose 97 70 - 99 mg/dL    Urea Nitrogen 17 7 - 30 mg/dL    Creatinine 0.94 0.66 - 1.25 mg/dL    GFR Estimate 80 >60 mL/min/1.7m2    GFR Estimate If Black >90 >60 mL/min/1.7m2    Calcium 9.2 8.5 - 10.1 mg/dL   CBC with platelets   Result Value Ref Range    WBC 6.3 4.0 - 11.0 10e9/L    RBC Count 4.12 (L) 4.4 - 5.9 10e12/L    Hemoglobin 11.7 (L) 13.3 - 17.7 g/dL    Hematocrit 33.6 (L) 40.0 - 53.0 %    MCV 82 78 - 100 fl    MCH 28.4 26.5 - 33.0 pg    MCHC 34.8 31.5 - 36.5 g/dL    RDW 14.1 10.0 - 15.0 %    Platelet Count 351 150 - 450 10e9/L       Discharge Treatments:  ambulate with walker, drink fluids  Blood sugars BID, moderate consisent carbohydrate diet    TOTAL DISCHARGE TIME:   Greater than 30 minutes  Electronically signed by:  MIKE Fontenot CNP